# Patient Record
Sex: FEMALE | Race: BLACK OR AFRICAN AMERICAN | NOT HISPANIC OR LATINO | Employment: UNEMPLOYED | URBAN - METROPOLITAN AREA
[De-identification: names, ages, dates, MRNs, and addresses within clinical notes are randomized per-mention and may not be internally consistent; named-entity substitution may affect disease eponyms.]

---

## 2017-09-07 PROCEDURE — 99283 EMERGENCY DEPT VISIT LOW MDM: CPT

## 2017-09-08 ENCOUNTER — NURSE TRIAGE (OUTPATIENT)
Dept: ADMINISTRATIVE | Facility: CLINIC | Age: 27
End: 2017-09-08

## 2017-09-08 ENCOUNTER — HOSPITAL ENCOUNTER (EMERGENCY)
Facility: HOSPITAL | Age: 27
Discharge: HOME | End: 2017-09-08
Attending: EMERGENCY MEDICINE
Payer: MEDICAID

## 2017-09-08 VITALS
WEIGHT: 230 LBS | DIASTOLIC BLOOD PRESSURE: 68 MMHG | SYSTOLIC BLOOD PRESSURE: 142 MMHG | BODY MASS INDEX: 38.32 KG/M2 | HEART RATE: 86 BPM | HEIGHT: 65 IN | OXYGEN SATURATION: 99 % | TEMPERATURE: 99 F | RESPIRATION RATE: 18 BRPM

## 2017-09-08 DIAGNOSIS — R07.9 CHEST PAIN: ICD-10-CM

## 2017-09-08 DIAGNOSIS — T18.108A FOREIGN BODY IN ESOPHAGUS: Primary | ICD-10-CM

## 2017-09-08 DIAGNOSIS — T17.208A: ICD-10-CM

## 2017-09-08 PROCEDURE — 25000003 PHARM REV CODE 250: Performed by: EMERGENCY MEDICINE

## 2017-09-08 RX ADMIN — LIDOCAINE HYDROCHLORIDE: 20 SOLUTION ORAL; TOPICAL at 01:09

## 2017-09-08 NOTE — TELEPHONE ENCOUNTER
Reason for Disposition   Requesting regular office appointment    Protocols used: ST INFORMATION ONLY CALL-A-AH    Dahlia was calling to schedule an appointment with ENT.  She has already been evaluated in ER and referred to ENT.  No triage necessary.  Transferred to Appointment desk

## 2017-09-08 NOTE — ED PROVIDER NOTES
Encounter Date: 9/7/2017       History     Chief Complaint   Patient presents with    Foreign Body In Throat     states she has a shell stuck in her throat for 3 days now     Foreign Body: Patient complains of foreign body in throat/esophagus. It was first noticed 3 days ago. Symptoms: Multiple Attempts to remove it by eating bread and sandwiches have failed. She thinks that she can feel the claw of a crab on the right under her tonsils          Review of patient's allergies indicates:  No Known Allergies  Past Medical History:   Diagnosis Date    Nerve palsy     left arm     History reviewed. No pertinent surgical history.  History reviewed. No pertinent family history.  Social History   Substance Use Topics    Smoking status: Never Smoker    Smokeless tobacco: Never Used    Alcohol use Yes      Comment: occasionally     Review of Systems   Constitutional: Negative for fever.   HENT: Positive for trouble swallowing. Negative for sore throat.    Respiratory: Negative for shortness of breath.    Cardiovascular: Negative for chest pain.   Gastrointestinal: Negative for nausea.   Genitourinary: Negative for dysuria.   Musculoskeletal: Negative for back pain.   Skin: Negative for rash.   Neurological: Negative for weakness.   Hematological: Does not bruise/bleed easily.   All other systems reviewed and are negative.      Physical Exam     Initial Vitals   BP Pulse Resp Temp SpO2   -- -- -- -- --      MAP       --         Physical Exam    Constitutional: Vital signs are normal. She appears well-developed and well-nourished. She is cooperative.  Non-toxic appearance.   HENT:   Head: Normocephalic and atraumatic.   Mouth/Throat: Oropharynx is clear and moist and mucous membranes are normal. No posterior oropharyngeal edema or posterior oropharyngeal erythema.   Eyes: Conjunctivae, EOM and lids are normal.   Neck: Trachea normal and normal range of motion. Neck supple. No stridor present. No tracheal deviation present.  No neck rigidity. No Brudzinski's sign and no Kernig's sign noted.   Cardiovascular: Normal rate, regular rhythm, normal heart sounds and normal pulses.   Abdominal: Soft. Normal appearance and bowel sounds are normal. She exhibits no abdominal bruit. There is no tenderness. There is no rebound.   Neurological: She is alert and oriented to person, place, and time. She has normal strength. GCS eye subscore is 4. GCS verbal subscore is 5. GCS motor subscore is 6.   Skin: Skin is warm, dry and intact. Capillary refill takes less than 2 seconds.   Psychiatric: She has a normal mood and affect. Her behavior is normal. Judgment normal. Thought content is not delusional. She expresses no homicidal and no suicidal ideation.         ED Course   Procedures  Labs Reviewed - No data to display       X-Rays:   Independently Interpreted Readings:   Chest X-Ray: Normal heart size.  No infiltrates.  No acute abnormalities.   Other Readings:  Soft tissue films of the neck are unrevealing.   No FB seen.        - none    There were no vitals filed for this visit.         Imaging Results          X-Ray Neck Soft Tissue (In process)                X-Ray Chest PA And Lateral (In process)                    The above test results and vital signs have been reviewed by the physician.    I have a low suspicion for medical, surgical or other life threatening illness and I believe patient is safe for discharge.  I specifically counseled the patient and/or family/caretaker that despite an unrevealing evaluation in the ED, patient may still be at risk for serious, even life threatening illness.  I have attempted to answer all questions related to her stay today.  I have given the patient explicit instructions to return immediately for any worsening or change in current symptoms, or for any concern.      I will refer to ent for evaluation.  No crepitus.  No pneumomediastinum              ED Course      Clinical Impression:   The primary encounter  diagnosis was Foreign body in esophagus. Diagnoses of Chest pain and Pharyngeal foreign body, initial encounter were also pertinent to this visit.    Disposition:   Disposition: Discharged  Condition: Stable                        Roberth Metzger MD  09/08/17 0109

## 2018-08-19 ENCOUNTER — HOSPITAL ENCOUNTER (EMERGENCY)
Facility: HOSPITAL | Age: 28
Discharge: HOME | End: 2018-08-19
Attending: FAMILY MEDICINE
Payer: MEDICAID

## 2018-08-19 VITALS
OXYGEN SATURATION: 100 % | HEART RATE: 78 BPM | RESPIRATION RATE: 18 BRPM | DIASTOLIC BLOOD PRESSURE: 72 MMHG | SYSTOLIC BLOOD PRESSURE: 127 MMHG | TEMPERATURE: 99 F | BODY MASS INDEX: 34.11 KG/M2 | WEIGHT: 205 LBS

## 2018-08-19 DIAGNOSIS — O21.0 HYPEREMESIS GRAVIDARUM: Primary | ICD-10-CM

## 2018-08-19 LAB
ALBUMIN SERPL BCP-MCNC: 4 G/DL
ALP SERPL-CCNC: 42 U/L
ALT SERPL W/O P-5'-P-CCNC: 20 U/L
ANION GAP SERPL CALC-SCNC: 10 MMOL/L
AST SERPL-CCNC: 19 U/L
BACTERIA #/AREA URNS AUTO: NORMAL /HPF
BASOPHILS # BLD AUTO: 0.01 K/UL
BASOPHILS NFR BLD: 0.1 %
BILIRUB SERPL-MCNC: 0.6 MG/DL
BILIRUB UR QL STRIP: NEGATIVE
BUN SERPL-MCNC: 9 MG/DL
CALCIUM SERPL-MCNC: 9.3 MG/DL
CHLORIDE SERPL-SCNC: 104 MMOL/L
CLARITY UR REFRACT.AUTO: ABNORMAL
CO2 SERPL-SCNC: 23 MMOL/L
COLOR UR AUTO: YELLOW
CREAT SERPL-MCNC: 0.82 MG/DL
DIFFERENTIAL METHOD: ABNORMAL
EOSINOPHIL # BLD AUTO: 0.1 K/UL
EOSINOPHIL NFR BLD: 0.6 %
ERYTHROCYTE [DISTWIDTH] IN BLOOD BY AUTOMATED COUNT: 12.5 %
EST. GFR  (AFRICAN AMERICAN): >60 ML/MIN/1.73 M^2
EST. GFR  (NON AFRICAN AMERICAN): >60 ML/MIN/1.73 M^2
GLUCOSE SERPL-MCNC: 84 MG/DL
GLUCOSE UR QL STRIP: NEGATIVE
HCT VFR BLD AUTO: 32.1 %
HGB BLD-MCNC: 10.7 G/DL
HGB UR QL STRIP: NEGATIVE
HYALINE CASTS UR QL AUTO: 0 /LPF
KETONES UR QL STRIP: ABNORMAL
LEUKOCYTE ESTERASE UR QL STRIP: ABNORMAL
LYMPHOCYTES # BLD AUTO: 1.5 K/UL
LYMPHOCYTES NFR BLD: 18.2 %
MCH RBC QN AUTO: 28.7 PG
MCHC RBC AUTO-ENTMCNC: 33.3 G/DL
MCV RBC AUTO: 86 FL
MICROSCOPIC COMMENT: NORMAL
MONOCYTES # BLD AUTO: 0.8 K/UL
MONOCYTES NFR BLD: 9.2 %
NEUTROPHILS # BLD AUTO: 5.8 K/UL
NEUTROPHILS NFR BLD: 71.5 %
NITRITE UR QL STRIP: NEGATIVE
PH UR STRIP: 6 [PH] (ref 5–8)
PLATELET # BLD AUTO: 309 K/UL
PMV BLD AUTO: 10.1 FL
POTASSIUM SERPL-SCNC: 3.8 MMOL/L
PROT SERPL-MCNC: 7.6 G/DL
PROT UR QL STRIP: ABNORMAL
RBC # BLD AUTO: 3.73 M/UL
RBC #/AREA URNS AUTO: 1 /HPF (ref 0–4)
SODIUM SERPL-SCNC: 137 MMOL/L
SP GR UR STRIP: 1.02 (ref 1–1.03)
SQUAMOUS #/AREA URNS AUTO: NORMAL /HPF
URN SPEC COLLECT METH UR: ABNORMAL
UROBILINOGEN UR STRIP-ACNC: ABNORMAL EU/DL
WBC # BLD AUTO: 8.17 K/UL
WBC #/AREA URNS AUTO: 4 /HPF (ref 0–5)
WBC CLUMPS UR QL AUTO: NORMAL

## 2018-08-19 PROCEDURE — 81000 URINALYSIS NONAUTO W/SCOPE: CPT

## 2018-08-19 PROCEDURE — 96374 THER/PROPH/DIAG INJ IV PUSH: CPT

## 2018-08-19 PROCEDURE — 85025 COMPLETE CBC W/AUTO DIFF WBC: CPT

## 2018-08-19 PROCEDURE — 99283 EMERGENCY DEPT VISIT LOW MDM: CPT | Mod: 25

## 2018-08-19 PROCEDURE — 96361 HYDRATE IV INFUSION ADD-ON: CPT

## 2018-08-19 PROCEDURE — 63600175 PHARM REV CODE 636 W HCPCS: Performed by: FAMILY MEDICINE

## 2018-08-19 PROCEDURE — 80053 COMPREHEN METABOLIC PANEL: CPT

## 2018-08-19 PROCEDURE — 25000003 PHARM REV CODE 250: Performed by: FAMILY MEDICINE

## 2018-08-19 RX ORDER — ONDANSETRON 2 MG/ML
4 INJECTION INTRAMUSCULAR; INTRAVENOUS
Status: COMPLETED | OUTPATIENT
Start: 2018-08-19 | End: 2018-08-19

## 2018-08-19 RX ORDER — ONDANSETRON 4 MG/1
4 TABLET, FILM COATED ORAL EVERY 8 HOURS PRN
Qty: 12 TABLET | Refills: 0 | Status: ON HOLD | OUTPATIENT
Start: 2018-08-19 | End: 2019-02-20

## 2018-08-19 RX ADMIN — ONDANSETRON 4 MG: 2 INJECTION INTRAMUSCULAR; INTRAVENOUS at 06:08

## 2018-08-19 RX ADMIN — SODIUM CHLORIDE 1000 ML: 0.9 INJECTION, SOLUTION INTRAVENOUS at 06:08

## 2018-08-19 NOTE — ED PROVIDER NOTES
Encounter Date: 8/19/2018       History     Chief Complaint   Patient presents with    Vomiting     vomiting that started today with body aches    Generalized Body Aches     Patient is pregnant and complains of nausea since last 2 weeks she had few episodes of vomiting able to tolerate anything since morning.  Generalized body aches, no fever.  No lower abdominal pain. No vaginal discharge or dysuria.      The history is provided by the patient.     Review of patient's allergies indicates:  No Known Allergies  Past Medical History:   Diagnosis Date    Nerve palsy     left arm     History reviewed. No pertinent surgical history.  History reviewed. No pertinent family history.  Social History     Tobacco Use    Smoking status: Never Smoker    Smokeless tobacco: Never Used   Substance Use Topics    Alcohol use: Yes     Comment: occasionally    Drug use: Not on file     Review of Systems   Constitutional: Negative for activity change, appetite change, chills and fever.   HENT: Negative for congestion, ear pain and sore throat.    Eyes: Negative for pain, discharge and itching.   Respiratory: Negative for cough, shortness of breath and wheezing.    Cardiovascular: Negative for chest pain and palpitations.   Gastrointestinal: Positive for nausea and vomiting. Negative for abdominal distention, abdominal pain and diarrhea.   Genitourinary: Negative for dysuria, flank pain and frequency.   Musculoskeletal: Negative for back pain, neck pain and neck stiffness.   Skin: Negative for rash.   Neurological: Negative for dizziness and headaches.   Psychiatric/Behavioral: Negative for confusion and hallucinations. The patient is not nervous/anxious.    All other systems reviewed and are negative.      Physical Exam     Initial Vitals [08/19/18 1745]   BP Pulse Resp Temp SpO2   127/71 78 18 98.5 °F (36.9 °C) 99 %      MAP       --         Physical Exam    Nursing note and vitals reviewed.  Constitutional: She appears  well-developed and well-nourished.   HENT:   Head: Normocephalic.   Nose: Nose normal.   Eyes: Conjunctivae are normal. Pupils are equal, round, and reactive to light.   Neck: Normal range of motion.   Cardiovascular: Normal rate, regular rhythm, normal heart sounds and intact distal pulses.   Pulmonary/Chest: Breath sounds normal. No respiratory distress. She has no wheezes. She has no rhonchi. She has no rales.   Abdominal: Soft. There is no tenderness. There is no guarding.   Musculoskeletal: Normal range of motion.   Neurological: She is alert and oriented to person, place, and time. She has normal strength and normal reflexes.   Skin: Skin is warm. Capillary refill takes less than 2 seconds. No rash noted. No erythema.         ED Course   Procedures  Labs Reviewed   URINALYSIS          Imaging Results    None          Medical Decision Making:   Initial Assessment:   28-year-old female complains of nausea vomiting after getting pregnant for the last 2 weeks.  Has been taking her friend's Zofran but could not eat anything since this morning so came to the ER for evaluation.  Denies any abdominal pain, vaginal discharge on bleeding.  Differential Diagnosis:   Pregnancy, hyperemesis gravidarum, gastritis, UTI, dehydration.  Clinical Tests:   Lab Tests: Ordered and Reviewed  ED Management:  Patient has been given Zofran, IV fluids, labs within normal range,.  Patient's symptoms have improved and started tolerating p.o..  Patient symptoms most likely hyperemesis gravidarum.  Patient requested Zofran prescription.  Advise to follow up low BG if symptoms persist.  All to the ER if symptoms worsen.                      Clinical Impression:   The encounter diagnosis was Hyperemesis gravidarum.      Disposition:   Disposition: Discharged  Condition: Jonny George MD  08/20/18 8350

## 2018-08-20 NOTE — ED NOTES
Pt comfortably lying in bed. Respirations even and unlabored. Skin warm and dry. NAD noted. Pt reports relief from N/V symptoms. PO challenge of powerade and vlad crackers provided. Pt tolerating well. Vital signs remain stable. Call light remains in reach. Pt instructed to notify staff should needs arise. Understanding verbalized. Will continue to monitor.

## 2019-02-20 ENCOUNTER — HOSPITAL ENCOUNTER (OUTPATIENT)
Facility: HOSPITAL | Age: 29
Discharge: HOME | End: 2019-02-20
Attending: OBSTETRICS & GYNECOLOGY | Admitting: OBSTETRICS & GYNECOLOGY
Payer: MEDICAID

## 2019-02-20 VITALS
DIASTOLIC BLOOD PRESSURE: 75 MMHG | TEMPERATURE: 100 F | WEIGHT: 209 LBS | SYSTOLIC BLOOD PRESSURE: 137 MMHG | HEART RATE: 113 BPM | HEIGHT: 65 IN | RESPIRATION RATE: 18 BRPM | BODY MASS INDEX: 34.82 KG/M2

## 2019-02-20 DIAGNOSIS — R50.9 FEVER: ICD-10-CM

## 2019-02-20 LAB
ALBUMIN SERPL BCP-MCNC: 2.7 G/DL
ALP SERPL-CCNC: 119 U/L
ALT SERPL W/O P-5'-P-CCNC: 16 U/L
ANION GAP SERPL CALC-SCNC: 7 MMOL/L
AST SERPL-CCNC: 19 U/L
BACTERIA #/AREA URNS HPF: NORMAL /HPF
BASOPHILS # BLD AUTO: 0.01 K/UL
BASOPHILS NFR BLD: 0.1 %
BILIRUB SERPL-MCNC: 0.8 MG/DL
BILIRUB UR QL STRIP: NEGATIVE
BUN SERPL-MCNC: 5 MG/DL
CALCIUM SERPL-MCNC: 8.9 MG/DL
CHLORIDE SERPL-SCNC: 106 MMOL/L
CLARITY UR: ABNORMAL
CO2 SERPL-SCNC: 21 MMOL/L
COLOR UR: ABNORMAL
CREAT SERPL-MCNC: 0.8 MG/DL
DIFFERENTIAL METHOD: ABNORMAL
EOSINOPHIL # BLD AUTO: 0.1 K/UL
EOSINOPHIL NFR BLD: 0.6 %
ERYTHROCYTE [DISTWIDTH] IN BLOOD BY AUTOMATED COUNT: 13.2 %
EST. GFR  (AFRICAN AMERICAN): >60 ML/MIN/1.73 M^2
EST. GFR  (NON AFRICAN AMERICAN): >60 ML/MIN/1.73 M^2
GLUCOSE SERPL-MCNC: 70 MG/DL
GLUCOSE UR QL STRIP: NEGATIVE
HCT VFR BLD AUTO: 27.6 %
HGB BLD-MCNC: 8.9 G/DL
HGB UR QL STRIP: NEGATIVE
HYALINE CASTS #/AREA URNS LPF: 0 /LPF
INFLUENZA A, MOLECULAR: POSITIVE
INFLUENZA B, MOLECULAR: NEGATIVE
KETONES UR QL STRIP: ABNORMAL
LEUKOCYTE ESTERASE UR QL STRIP: NEGATIVE
LYMPHOCYTES # BLD AUTO: 1 K/UL
LYMPHOCYTES NFR BLD: 11.9 %
MCH RBC QN AUTO: 28.3 PG
MCHC RBC AUTO-ENTMCNC: 32.2 G/DL
MCV RBC AUTO: 88 FL
MICROSCOPIC COMMENT: NORMAL
MONOCYTES # BLD AUTO: 0.5 K/UL
MONOCYTES NFR BLD: 5.8 %
NEUTROPHILS # BLD AUTO: 6.7 K/UL
NEUTROPHILS NFR BLD: 80.8 %
NITRITE UR QL STRIP: NEGATIVE
PH UR STRIP: >8 [PH] (ref 5–8)
PLATELET # BLD AUTO: 219 K/UL
PMV BLD AUTO: 10.3 FL
POTASSIUM SERPL-SCNC: 3.7 MMOL/L
PROT SERPL-MCNC: 6.2 G/DL
PROT UR QL STRIP: ABNORMAL
RBC # BLD AUTO: 3.14 M/UL
RBC #/AREA URNS HPF: 1 /HPF (ref 0–4)
SODIUM SERPL-SCNC: 134 MMOL/L
SP GR UR STRIP: 1.01 (ref 1–1.03)
SPECIMEN SOURCE: ABNORMAL
URATE SERPL-MCNC: 4.7 MG/DL
URN SPEC COLLECT METH UR: ABNORMAL
UROBILINOGEN UR STRIP-ACNC: 1 EU/DL
WBC # BLD AUTO: 8.3 K/UL
WBC #/AREA URNS HPF: 3 /HPF (ref 0–5)

## 2019-02-20 PROCEDURE — 99211 OFF/OP EST MAY X REQ PHY/QHP: CPT | Mod: 25

## 2019-02-20 PROCEDURE — 87502 INFLUENZA DNA AMP PROBE: CPT

## 2019-02-20 PROCEDURE — 84550 ASSAY OF BLOOD/URIC ACID: CPT

## 2019-02-20 PROCEDURE — 85025 COMPLETE CBC W/AUTO DIFF WBC: CPT

## 2019-02-20 PROCEDURE — 25000003 PHARM REV CODE 250: Performed by: OBSTETRICS & GYNECOLOGY

## 2019-02-20 PROCEDURE — 59025 FETAL NON-STRESS TEST: CPT

## 2019-02-20 PROCEDURE — 80053 COMPREHEN METABOLIC PANEL: CPT

## 2019-02-20 PROCEDURE — 36415 COLL VENOUS BLD VENIPUNCTURE: CPT

## 2019-02-20 PROCEDURE — 81000 URINALYSIS NONAUTO W/SCOPE: CPT

## 2019-02-20 PROCEDURE — 96360 HYDRATION IV INFUSION INIT: CPT

## 2019-02-20 RX ORDER — ONDANSETRON 8 MG/1
8 TABLET, ORALLY DISINTEGRATING ORAL EVERY 8 HOURS PRN
Status: DISCONTINUED | OUTPATIENT
Start: 2019-02-20 | End: 2019-02-20 | Stop reason: HOSPADM

## 2019-02-20 RX ORDER — ACETAMINOPHEN 500 MG
500 TABLET ORAL EVERY 6 HOURS PRN
Status: DISCONTINUED | OUTPATIENT
Start: 2019-02-20 | End: 2019-02-20

## 2019-02-20 RX ORDER — ACETAMINOPHEN 500 MG
1000 TABLET ORAL EVERY 6 HOURS PRN
Status: DISCONTINUED | OUTPATIENT
Start: 2019-02-20 | End: 2019-02-20 | Stop reason: HOSPADM

## 2019-02-20 RX ADMIN — ACETAMINOPHEN 1000 MG: 500 TABLET ORAL at 12:02

## 2019-02-20 NOTE — NURSING
11:32 AM Patient in for ^ BP and fever, brought to triage 1, changed patient into clean gown.    11:41 AM Patient in to triage with body aches and fever. Patient was feeling hot and cold at home, jaw ache and frontal head pressure. Went to Dr Viera's office and temp was 101, sent over here. + FM, denies abd pain, denies vaginal bleeding or leakage of fluid. Denies taking medication for pain, headache or fever. Patient endorses light unproductive cough. Baby placed on monitor, VS obtained, Temp 99.6 and patient warm to touch. Cervix closed, thick, and high.     11:54 AM Spoke to Dr Viera, informed of the above. MD stated in the office she had a fever of 101 and +2 protein in the urine. MD ordered serial BP's, CBC, CMP, Uric Acid, clean catch U/A, and a rapid flu test.     12:08 PM Nasal swab collected, sent to lab. Face mask placed on patient, lab at bedside to draw blood.     1:48 PM Updated Dr Viera on patient's condition and labs, patient feeling minor ctx on strip, MD ordered a litre of LR for hydration, will review strip.     3:15 PM Spoke with MD, stated patient's abd pain improvement and fetal strip improvement. MD said okay for patient to be d/c home, is sending an Rx to her pharmacy for the flu.     3:26 PM Reviewed discharge instructions with patient, educating on expected third trimester changes, influenza, and s/s to call in for. Patient verbalized acknowledgement, IV removed, pressure and bandage applied. Patient to get dressed and ambulate from unit.     3:40 PM Patient ambulated from unit with mask on without difficulty.

## 2019-03-13 ENCOUNTER — ANESTHESIA (OUTPATIENT)
Dept: OBSTETRICS AND GYNECOLOGY | Facility: HOSPITAL | Age: 29
End: 2019-03-13
Payer: MEDICAID

## 2019-03-13 ENCOUNTER — ANESTHESIA EVENT (OUTPATIENT)
Dept: OBSTETRICS AND GYNECOLOGY | Facility: HOSPITAL | Age: 29
End: 2019-03-13
Payer: MEDICAID

## 2019-03-13 ENCOUNTER — HOSPITAL ENCOUNTER (INPATIENT)
Facility: HOSPITAL | Age: 29
LOS: 4 days | Discharge: HOME | End: 2019-03-17
Attending: OBSTETRICS & GYNECOLOGY | Admitting: OBSTETRICS & GYNECOLOGY
Payer: MEDICAID

## 2019-03-13 DIAGNOSIS — O16.3 HYPERTENSION AFFECTING PREGNANCY IN THIRD TRIMESTER: ICD-10-CM

## 2019-03-13 LAB
ABO + RH BLD: NORMAL
ALBUMIN SERPL BCP-MCNC: 2.8 G/DL
ALP SERPL-CCNC: 162 U/L
ALT SERPL W/O P-5'-P-CCNC: 15 U/L
ANION GAP SERPL CALC-SCNC: 8 MMOL/L
ANISOCYTOSIS BLD QL SMEAR: SLIGHT
AST SERPL-CCNC: 14 U/L
BACTERIA #/AREA URNS HPF: ABNORMAL /HPF
BASOPHILS # BLD AUTO: 0.01 K/UL
BASOPHILS NFR BLD: 0.1 %
BILIRUB SERPL-MCNC: 0.6 MG/DL
BILIRUB UR QL STRIP: NEGATIVE
BILIRUB UR QL STRIP: NEGATIVE
BLD GP AB SCN CELLS X3 SERPL QL: NORMAL
BUN SERPL-MCNC: 7 MG/DL
CALCIUM SERPL-MCNC: 8.9 MG/DL
CHLORIDE SERPL-SCNC: 107 MMOL/L
CLARITY UR: ABNORMAL
CLARITY UR: CLEAR
CO2 SERPL-SCNC: 20 MMOL/L
COLOR UR: YELLOW
COLOR UR: YELLOW
CREAT SERPL-MCNC: 0.8 MG/DL
CREAT UR-MCNC: 286.5 MG/DL
CREAT UR-MCNC: 84.7 MG/DL
DIFFERENTIAL METHOD: ABNORMAL
EOSINOPHIL # BLD AUTO: 0.1 K/UL
EOSINOPHIL NFR BLD: 0.9 %
ERYTHROCYTE [DISTWIDTH] IN BLOOD BY AUTOMATED COUNT: 13.7 %
EST. GFR  (AFRICAN AMERICAN): >60 ML/MIN/1.73 M^2
EST. GFR  (NON AFRICAN AMERICAN): >60 ML/MIN/1.73 M^2
GLUCOSE SERPL-MCNC: 107 MG/DL
GLUCOSE UR QL STRIP: NEGATIVE
GLUCOSE UR QL STRIP: NEGATIVE
HCT VFR BLD AUTO: 30.1 %
HGB BLD-MCNC: 9.5 G/DL
HGB UR QL STRIP: NEGATIVE
HGB UR QL STRIP: NEGATIVE
HYALINE CASTS #/AREA URNS LPF: 0 /LPF
HYPOCHROMIA BLD QL SMEAR: ABNORMAL
KETONES UR QL STRIP: ABNORMAL
KETONES UR QL STRIP: NEGATIVE
LEUKOCYTE ESTERASE UR QL STRIP: NEGATIVE
LEUKOCYTE ESTERASE UR QL STRIP: NEGATIVE
LYMPHOCYTES # BLD AUTO: 1.6 K/UL
LYMPHOCYTES NFR BLD: 19.4 %
MCH RBC QN AUTO: 27.9 PG
MCHC RBC AUTO-ENTMCNC: 31.6 G/DL
MCV RBC AUTO: 89 FL
MICROSCOPIC COMMENT: ABNORMAL
MONOCYTES # BLD AUTO: 0.8 K/UL
MONOCYTES NFR BLD: 10.1 %
NEUTROPHILS # BLD AUTO: 5.7 K/UL
NEUTROPHILS NFR BLD: 69.5 %
NITRITE UR QL STRIP: NEGATIVE
NITRITE UR QL STRIP: NEGATIVE
OVALOCYTES BLD QL SMEAR: ABNORMAL
PH UR STRIP: 6 [PH] (ref 5–8)
PH UR STRIP: 7 [PH] (ref 5–8)
PLATELET # BLD AUTO: 332 K/UL
PLATELET BLD QL SMEAR: ABNORMAL
PMV BLD AUTO: 10.8 FL
POIKILOCYTOSIS BLD QL SMEAR: SLIGHT
POLYCHROMASIA BLD QL SMEAR: ABNORMAL
POTASSIUM SERPL-SCNC: 3.6 MMOL/L
PROT SERPL-MCNC: 6.9 G/DL
PROT UR QL STRIP: ABNORMAL
PROT UR QL STRIP: NEGATIVE
PROT UR-MCNC: 15 MG/DL
PROT UR-MCNC: 161 MG/DL
PROT/CREAT UR: 0.18 MG/G{CREAT}
PROT/CREAT UR: 0.56 MG/G{CREAT}
RBC # BLD AUTO: 3.4 M/UL
RBC #/AREA URNS HPF: 0 /HPF (ref 0–4)
SODIUM SERPL-SCNC: 135 MMOL/L
SP GR UR STRIP: 1.01 (ref 1–1.03)
SP GR UR STRIP: >=1.03 (ref 1–1.03)
SQUAMOUS #/AREA URNS HPF: 10 /HPF
URATE SERPL-MCNC: 4.4 MG/DL
URN SPEC COLLECT METH UR: ABNORMAL
URN SPEC COLLECT METH UR: NORMAL
UROBILINOGEN UR STRIP-ACNC: 1 EU/DL
UROBILINOGEN UR STRIP-ACNC: NEGATIVE EU/DL
WBC # BLD AUTO: 8.14 K/UL
WBC #/AREA URNS HPF: 0 /HPF (ref 0–5)

## 2019-03-13 PROCEDURE — 86592 SYPHILIS TEST NON-TREP QUAL: CPT

## 2019-03-13 PROCEDURE — 72100002 HC LABOR CARE, 1ST 8 HOURS

## 2019-03-13 PROCEDURE — 27800516 HC TRAY, EPIDURAL COMBO: Performed by: ANESTHESIOLOGY

## 2019-03-13 PROCEDURE — 36415 COLL VENOUS BLD VENIPUNCTURE: CPT

## 2019-03-13 PROCEDURE — 81003 URINALYSIS AUTO W/O SCOPE: CPT

## 2019-03-13 PROCEDURE — 81000 URINALYSIS NONAUTO W/SCOPE: CPT

## 2019-03-13 PROCEDURE — 25000003 PHARM REV CODE 250: Performed by: OBSTETRICS & GYNECOLOGY

## 2019-03-13 PROCEDURE — 85025 COMPLETE CBC W/AUTO DIFF WBC: CPT

## 2019-03-13 PROCEDURE — 27200710 HC EPIDURAL INFUSION PUMP SET: Performed by: ANESTHESIOLOGY

## 2019-03-13 PROCEDURE — 11000001 HC ACUTE MED/SURG PRIVATE ROOM

## 2019-03-13 PROCEDURE — 59025 FETAL NON-STRESS TEST: CPT

## 2019-03-13 PROCEDURE — 82570 ASSAY OF URINE CREATININE: CPT

## 2019-03-13 PROCEDURE — 62326 NJX INTERLAMINAR LMBR/SAC: CPT | Performed by: ANESTHESIOLOGY

## 2019-03-13 PROCEDURE — 86901 BLOOD TYPING SEROLOGIC RH(D): CPT

## 2019-03-13 PROCEDURE — 84550 ASSAY OF BLOOD/URIC ACID: CPT

## 2019-03-13 PROCEDURE — 51702 INSERT TEMP BLADDER CATH: CPT

## 2019-03-13 PROCEDURE — 80053 COMPREHEN METABOLIC PANEL: CPT

## 2019-03-13 PROCEDURE — 25000003 PHARM REV CODE 250: Performed by: FAMILY MEDICINE

## 2019-03-13 PROCEDURE — 63600175 PHARM REV CODE 636 W HCPCS: Performed by: OBSTETRICS & GYNECOLOGY

## 2019-03-13 RX ORDER — OXYTOCIN/RINGER'S LACTATE 20/1000 ML
333 PLASTIC BAG, INJECTION (ML) INTRAVENOUS CONTINUOUS
Status: ACTIVE | OUTPATIENT
Start: 2019-03-13 | End: 2019-03-13

## 2019-03-13 RX ORDER — ROPIVACAINE HYDROCHLORIDE 2 MG/ML
INJECTION, SOLUTION EPIDURAL; INFILTRATION; PERINEURAL
Status: DISPENSED
Start: 2019-03-13 | End: 2019-03-14

## 2019-03-13 RX ORDER — ONDANSETRON 8 MG/1
8 TABLET, ORALLY DISINTEGRATING ORAL EVERY 8 HOURS PRN
Status: DISCONTINUED | OUTPATIENT
Start: 2019-03-13 | End: 2019-03-14 | Stop reason: SDUPTHER

## 2019-03-13 RX ORDER — SODIUM CHLORIDE, SODIUM LACTATE, POTASSIUM CHLORIDE, CALCIUM CHLORIDE 600; 310; 30; 20 MG/100ML; MG/100ML; MG/100ML; MG/100ML
INJECTION, SOLUTION INTRAVENOUS CONTINUOUS
Status: DISCONTINUED | OUTPATIENT
Start: 2019-03-13 | End: 2019-03-14

## 2019-03-13 RX ORDER — OXYTOCIN/RINGER'S LACTATE 20/1000 ML
41.7 PLASTIC BAG, INJECTION (ML) INTRAVENOUS CONTINUOUS
Status: DISPENSED | OUTPATIENT
Start: 2019-03-13 | End: 2019-03-13

## 2019-03-13 RX ORDER — OXYTOCIN/RINGER'S LACTATE 20/1000 ML
2 PLASTIC BAG, INJECTION (ML) INTRAVENOUS CONTINUOUS
Status: DISCONTINUED | OUTPATIENT
Start: 2019-03-13 | End: 2019-03-14

## 2019-03-13 RX ORDER — SODIUM CHLORIDE 9 MG/ML
INJECTION, SOLUTION INTRAVENOUS
Status: DISCONTINUED | OUTPATIENT
Start: 2019-03-13 | End: 2019-03-14

## 2019-03-13 RX ORDER — FENTANYL CITRATE 50 UG/ML
INJECTION, SOLUTION INTRAMUSCULAR; INTRAVENOUS
Status: DISPENSED
Start: 2019-03-13 | End: 2019-03-14

## 2019-03-13 RX ORDER — NALOXONE HCL 0.4 MG/ML
0.02 VIAL (ML) INJECTION
Status: DISCONTINUED | OUTPATIENT
Start: 2019-03-13 | End: 2019-03-17 | Stop reason: HOSPADM

## 2019-03-13 RX ORDER — ROPIVACAINE HYDROCHLORIDE 2 MG/ML
14 INJECTION, SOLUTION EPIDURAL; INFILTRATION; PERINEURAL CONTINUOUS
Status: DISCONTINUED | OUTPATIENT
Start: 2019-03-14 | End: 2019-03-14

## 2019-03-13 RX ADMIN — Medication 12 ML/HR: at 03:03

## 2019-03-13 RX ADMIN — SODIUM CHLORIDE, SODIUM LACTATE, POTASSIUM CHLORIDE, AND CALCIUM CHLORIDE: .6; .31; .03; .02 INJECTION, SOLUTION INTRAVENOUS at 02:03

## 2019-03-13 RX ADMIN — SODIUM CHLORIDE, SODIUM LACTATE, POTASSIUM CHLORIDE, AND CALCIUM CHLORIDE: .6; .31; .03; .02 INJECTION, SOLUTION INTRAVENOUS at 01:03

## 2019-03-13 RX ADMIN — SODIUM CHLORIDE, SODIUM LACTATE, POTASSIUM CHLORIDE, AND CALCIUM CHLORIDE: .6; .31; .03; .02 INJECTION, SOLUTION INTRAVENOUS at 07:03

## 2019-03-13 RX ADMIN — Medication 2 MILLI-UNITS/MIN: at 01:03

## 2019-03-13 NOTE — ANESTHESIA PREPROCEDURE EVALUATION
2019  Dahlia Forrest is a 28 y.o., female   At 38 weeks admitted for induction of labor due to new hypertension. Requesting labor analgesia.     Anesthesia Evaluation    I have reviewed the Patient Summary Reports.    I have reviewed the Nursing Notes.   I have reviewed the Medications.     Review of Systems  Anesthesia Hx:  No previous Anesthesia  Neg history of prior surgery.   Social:  Non-Smoker    Cardiovascular:   Hypertension    Renal/:  Renal/ Normal     Hepatic/GI:  Hepatic/GI Normal    Musculoskeletal:  Musculoskeletal Normal    OB/GYN/PEDS:  Previous vaginal delivery, no complications   Neurological:  Neurology Normal    Psych:  Psychiatric Normal           Physical Exam  General:  Well nourished    Airway/Jaw/Neck:  Airway Findings: Mouth Opening: Normal Tongue: Large  General Airway Assessment: Adult, Average  Mallampati: II  Improves to II with phonation.  TM Distance: Normal, at least 6 cm            Mental Status:  Mental Status Findings:  Cooperative, Alert and Oriented         Anesthesia Plan  Type of Anesthesia, risks & benefits discussed:  Anesthesia Type:  CSE  Patient's Preference:   Intra-op Monitoring Plan: standard ASA monitors  Intra-op Monitoring Plan Comments:   Post Op Pain Control Plan: per primary service following discharge from PACU  Post Op Pain Control Plan Comments:   Induction:   IV  Beta Blocker:  Patient is not currently on a Beta-Blocker (No further documentation required).       Informed Consent: Patient understands risks and agrees with Anesthesia plan.  Questions answered. Anesthesia consent signed with patient.  ASA Score: 3     Day of Surgery Review of History & Physical: I have interviewed and examined the patient. I have reviewed the patient's H&P dated:  There are no significant changes.          Ready For Surgery From Anesthesia Perspective.

## 2019-03-13 NOTE — H&P
IUP at 38 wks Sent in for IOL for borderline BP  See prenatal record, no change in history and physical    IUP at 38 wks  Borderline BP  Admit for delivery  Monitor for PIH

## 2019-03-13 NOTE — ANESTHESIA PROCEDURE NOTES
CSE    Patient location during procedure: OB  Start time: 3/13/2019 3:30 PM  Timeout: 3/13/2019 3:30 PM  End time: 3/13/2019 4:22 PM  Reason for block: at surgeon's request  Staffing  Anesthesiologist: Eugenio Pahceco MD  Resident/CRNA: Anita Valles MD  Performed: anesthesiologist and resident/CRNA   Preanesthetic Checklist  Completed: patient identified, site marked, surgical consent, pre-op evaluation, timeout performed, IV checked, risks and benefits discussed and monitors and equipment checked  CSE  Patient position: sitting  Prep: ChloraPrep  Patient monitoring: heart rate, cardiac monitor, continuous pulse ox and frequent blood pressure checks  Approach: midline  Spinal Needle  Needle type: pencil-tip   Needle gauge: 24 G  Needle length: 5 in  Epidural Needle  Injection technique: RICARDA air  Needle type: Tuohy   Needle gauge: 17 G  Needle length: 4 in  Needle insertion depth: 7 cm  Location: L4-5  Needle localization: anatomical landmarks  Catheter  Catheter type: end hole  Catheter size: 20 G  Catheter at skin depth: 12 cm  Test dose: lidocaine 1.5% with Epi 1-to-200,000  Additional Documentation: incremental injection  Assessment  Sensory level: T11

## 2019-03-14 LAB — RPR SER QL: NORMAL

## 2019-03-14 PROCEDURE — 37000008 HC ANESTHESIA 1ST 15 MINUTES: Performed by: OBSTETRICS & GYNECOLOGY

## 2019-03-14 PROCEDURE — 62326 NJX INTERLAMINAR LMBR/SAC: CPT | Performed by: ANESTHESIOLOGY

## 2019-03-14 PROCEDURE — 94799 UNLISTED PULMONARY SVC/PX: CPT

## 2019-03-14 PROCEDURE — 63600175 PHARM REV CODE 636 W HCPCS: Performed by: ANESTHESIOLOGY

## 2019-03-14 PROCEDURE — 11000001 HC ACUTE MED/SURG PRIVATE ROOM

## 2019-03-14 PROCEDURE — 27000181 HC CABLE, IUPC

## 2019-03-14 PROCEDURE — 63600175 PHARM REV CODE 636 W HCPCS

## 2019-03-14 PROCEDURE — 72100003 HC LABOR CARE, EA. ADDL. 8 HRS

## 2019-03-14 PROCEDURE — 72100002 HC LABOR CARE, 1ST 8 HOURS

## 2019-03-14 PROCEDURE — 25000003 PHARM REV CODE 250: Performed by: OBSTETRICS & GYNECOLOGY

## 2019-03-14 PROCEDURE — 51702 INSERT TEMP BLADDER CATH: CPT

## 2019-03-14 PROCEDURE — 37000009 HC ANESTHESIA EA ADD 15 MINS: Performed by: OBSTETRICS & GYNECOLOGY

## 2019-03-14 PROCEDURE — 36000685 HC CESAREAN SECTION LEVEL I

## 2019-03-14 PROCEDURE — 25000003 PHARM REV CODE 250: Performed by: ANESTHESIOLOGY

## 2019-03-14 PROCEDURE — 25000003 PHARM REV CODE 250

## 2019-03-14 PROCEDURE — S0028 INJECTION, FAMOTIDINE, 20 MG: HCPCS

## 2019-03-14 RX ORDER — OXYCODONE AND ACETAMINOPHEN 5; 325 MG/1; MG/1
1 TABLET ORAL EVERY 4 HOURS PRN
Status: DISCONTINUED | OUTPATIENT
Start: 2019-03-14 | End: 2019-03-17 | Stop reason: HOSPADM

## 2019-03-14 RX ORDER — KETOROLAC TROMETHAMINE 30 MG/ML
INJECTION, SOLUTION INTRAMUSCULAR; INTRAVENOUS
Status: DISCONTINUED | OUTPATIENT
Start: 2019-03-14 | End: 2019-03-14

## 2019-03-14 RX ORDER — DOCUSATE SODIUM 100 MG/1
200 CAPSULE, LIQUID FILLED ORAL 2 TIMES DAILY
Status: DISCONTINUED | OUTPATIENT
Start: 2019-03-14 | End: 2019-03-17 | Stop reason: HOSPADM

## 2019-03-14 RX ORDER — DIPHENHYDRAMINE HCL 25 MG
25 CAPSULE ORAL EVERY 4 HOURS PRN
Status: DISCONTINUED | OUTPATIENT
Start: 2019-03-14 | End: 2019-03-17 | Stop reason: HOSPADM

## 2019-03-14 RX ORDER — FENTANYL CITRATE 50 UG/ML
INJECTION, SOLUTION INTRAMUSCULAR; INTRAVENOUS
Status: DISPENSED
Start: 2019-03-14 | End: 2019-03-14

## 2019-03-14 RX ORDER — KETOROLAC TROMETHAMINE 30 MG/ML
30 INJECTION, SOLUTION INTRAMUSCULAR; INTRAVENOUS EVERY 6 HOURS
Status: DISPENSED | OUTPATIENT
Start: 2019-03-14 | End: 2019-03-15

## 2019-03-14 RX ORDER — SIMETHICONE 80 MG
1 TABLET,CHEWABLE ORAL EVERY 6 HOURS PRN
Status: DISCONTINUED | OUTPATIENT
Start: 2019-03-14 | End: 2019-03-17 | Stop reason: HOSPADM

## 2019-03-14 RX ORDER — NAPROXEN 500 MG/1
500 TABLET ORAL EVERY 8 HOURS PRN
Status: DISCONTINUED | OUTPATIENT
Start: 2019-03-14 | End: 2019-03-17 | Stop reason: HOSPADM

## 2019-03-14 RX ORDER — ACETAMINOPHEN 325 MG/1
650 TABLET ORAL EVERY 6 HOURS
Status: DISPENSED | OUTPATIENT
Start: 2019-03-14 | End: 2019-03-15

## 2019-03-14 RX ORDER — SODIUM CITRATE AND CITRIC ACID MONOHYDRATE 334; 500 MG/5ML; MG/5ML
SOLUTION ORAL
Status: COMPLETED
Start: 2019-03-14 | End: 2019-03-14

## 2019-03-14 RX ORDER — ROPIVACAINE HYDROCHLORIDE 2 MG/ML
INJECTION, SOLUTION EPIDURAL; INFILTRATION; PERINEURAL
Status: DISPENSED
Start: 2019-03-14 | End: 2019-03-14

## 2019-03-14 RX ORDER — MORPHINE SULFATE 4 MG/ML
2 INJECTION, SOLUTION INTRAMUSCULAR; INTRAVENOUS
Status: DISCONTINUED | OUTPATIENT
Start: 2019-03-14 | End: 2019-03-17 | Stop reason: HOSPADM

## 2019-03-14 RX ORDER — FAMOTIDINE 10 MG/ML
INJECTION INTRAVENOUS
Status: COMPLETED
Start: 2019-03-14 | End: 2019-03-14

## 2019-03-14 RX ORDER — MORPHINE SULFATE 4 MG/ML
INJECTION, SOLUTION INTRAMUSCULAR; INTRAVENOUS
Status: DISPENSED
Start: 2019-03-14 | End: 2019-03-15

## 2019-03-14 RX ORDER — LIDOCAINE HCL/EPINEPHRINE/PF 2%-1:200K
VIAL (ML) INJECTION
Status: DISCONTINUED | OUTPATIENT
Start: 2019-03-14 | End: 2019-03-14

## 2019-03-14 RX ORDER — CEFAZOLIN SODIUM 2 G/50ML
2 SOLUTION INTRAVENOUS ONCE
Status: DISCONTINUED | OUTPATIENT
Start: 2019-03-14 | End: 2019-03-14

## 2019-03-14 RX ORDER — OXYCODONE HYDROCHLORIDE 5 MG/1
10 TABLET ORAL EVERY 4 HOURS PRN
Status: DISCONTINUED | OUTPATIENT
Start: 2019-03-14 | End: 2019-03-17 | Stop reason: HOSPADM

## 2019-03-14 RX ORDER — BISACODYL 10 MG
10 SUPPOSITORY, RECTAL RECTAL ONCE AS NEEDED
Status: DISCONTINUED | OUTPATIENT
Start: 2019-03-14 | End: 2019-03-17 | Stop reason: HOSPADM

## 2019-03-14 RX ORDER — SODIUM CHLORIDE, SODIUM LACTATE, POTASSIUM CHLORIDE, CALCIUM CHLORIDE 600; 310; 30; 20 MG/100ML; MG/100ML; MG/100ML; MG/100ML
INJECTION, SOLUTION INTRAVENOUS CONTINUOUS
Status: ACTIVE | OUTPATIENT
Start: 2019-03-14 | End: 2019-03-15

## 2019-03-14 RX ORDER — METOCLOPRAMIDE HYDROCHLORIDE 5 MG/ML
INJECTION INTRAMUSCULAR; INTRAVENOUS
Status: COMPLETED
Start: 2019-03-14 | End: 2019-03-14

## 2019-03-14 RX ORDER — SODIUM CHLORIDE 9 MG/ML
INJECTION, SOLUTION INTRAVENOUS CONTINUOUS
Status: DISCONTINUED | OUTPATIENT
Start: 2019-03-14 | End: 2019-03-14

## 2019-03-14 RX ORDER — NIFEDIPINE 30 MG/1
60 TABLET, EXTENDED RELEASE ORAL DAILY
Status: DISCONTINUED | OUTPATIENT
Start: 2019-03-14 | End: 2019-03-17 | Stop reason: HOSPADM

## 2019-03-14 RX ORDER — FENTANYL CITRATE 50 UG/ML
INJECTION, SOLUTION INTRAMUSCULAR; INTRAVENOUS
Status: DISCONTINUED
Start: 2019-03-14 | End: 2019-03-14 | Stop reason: WASHOUT

## 2019-03-14 RX ORDER — DIPHENHYDRAMINE HYDROCHLORIDE 50 MG/ML
12.5 INJECTION INTRAMUSCULAR; INTRAVENOUS
Status: ACTIVE | OUTPATIENT
Start: 2019-03-14 | End: 2019-03-14

## 2019-03-14 RX ORDER — HYDROCORTISONE 25 MG/G
CREAM TOPICAL 3 TIMES DAILY PRN
Status: DISCONTINUED | OUTPATIENT
Start: 2019-03-14 | End: 2019-03-17 | Stop reason: HOSPADM

## 2019-03-14 RX ORDER — MUPIROCIN 20 MG/G
OINTMENT TOPICAL 2 TIMES DAILY
Status: DISCONTINUED | OUTPATIENT
Start: 2019-03-14 | End: 2019-03-15

## 2019-03-14 RX ORDER — MORPHINE SULFATE 1 MG/ML
INJECTION, SOLUTION EPIDURAL; INTRATHECAL; INTRAVENOUS
Status: DISCONTINUED | OUTPATIENT
Start: 2019-03-14 | End: 2019-03-14

## 2019-03-14 RX ORDER — DIPHENHYDRAMINE HYDROCHLORIDE 50 MG/ML
12.5 INJECTION INTRAMUSCULAR; INTRAVENOUS NIGHTLY PRN
Status: DISCONTINUED | OUTPATIENT
Start: 2019-03-14 | End: 2019-03-17 | Stop reason: HOSPADM

## 2019-03-14 RX ORDER — SODIUM CHLORIDE, SODIUM LACTATE, POTASSIUM CHLORIDE, CALCIUM CHLORIDE 600; 310; 30; 20 MG/100ML; MG/100ML; MG/100ML; MG/100ML
INJECTION, SOLUTION INTRAVENOUS CONTINUOUS PRN
Status: DISCONTINUED | OUTPATIENT
Start: 2019-03-14 | End: 2019-03-14

## 2019-03-14 RX ORDER — OXYCODONE AND ACETAMINOPHEN 10; 325 MG/1; MG/1
1 TABLET ORAL EVERY 4 HOURS PRN
Status: DISCONTINUED | OUTPATIENT
Start: 2019-03-14 | End: 2019-03-17 | Stop reason: HOSPADM

## 2019-03-14 RX ORDER — METOCLOPRAMIDE 10 MG/1
10 TABLET ORAL EVERY 6 HOURS PRN
Status: DISCONTINUED | OUTPATIENT
Start: 2019-03-14 | End: 2019-03-17 | Stop reason: HOSPADM

## 2019-03-14 RX ORDER — ADHESIVE BANDAGE
30 BANDAGE TOPICAL 2 TIMES DAILY PRN
Status: DISCONTINUED | OUTPATIENT
Start: 2019-03-15 | End: 2019-03-17 | Stop reason: HOSPADM

## 2019-03-14 RX ORDER — ONDANSETRON 2 MG/ML
4 INJECTION INTRAMUSCULAR; INTRAVENOUS EVERY 12 HOURS PRN
Status: DISCONTINUED | OUTPATIENT
Start: 2019-03-14 | End: 2019-03-17 | Stop reason: HOSPADM

## 2019-03-14 RX ORDER — MISOPROSTOL 200 UG/1
600 TABLET ORAL
Status: DISCONTINUED | OUTPATIENT
Start: 2019-03-14 | End: 2019-03-17 | Stop reason: HOSPADM

## 2019-03-14 RX ORDER — MUPIROCIN 20 MG/G
1 OINTMENT TOPICAL 2 TIMES DAILY
Status: DISCONTINUED | OUTPATIENT
Start: 2019-03-14 | End: 2019-03-17 | Stop reason: HOSPADM

## 2019-03-14 RX ORDER — MUPIROCIN 20 MG/G
OINTMENT TOPICAL
Status: DISPENSED
Start: 2019-03-14 | End: 2019-03-15

## 2019-03-14 RX ORDER — OXYTOCIN/RINGER'S LACTATE 20/1000 ML
41.65 PLASTIC BAG, INJECTION (ML) INTRAVENOUS CONTINUOUS
Status: DISPENSED | OUTPATIENT
Start: 2019-03-14 | End: 2019-03-14

## 2019-03-14 RX ORDER — ONDANSETRON 8 MG/1
8 TABLET, ORALLY DISINTEGRATING ORAL EVERY 8 HOURS PRN
Status: DISCONTINUED | OUTPATIENT
Start: 2019-03-14 | End: 2019-03-17 | Stop reason: HOSPADM

## 2019-03-14 RX ORDER — ONDANSETRON HYDROCHLORIDE 2 MG/ML
INJECTION, SOLUTION INTRAMUSCULAR; INTRAVENOUS
Status: DISCONTINUED | OUTPATIENT
Start: 2019-03-14 | End: 2019-03-14

## 2019-03-14 RX ORDER — OXYTOCIN 10 [USP'U]/ML
INJECTION, SOLUTION INTRAMUSCULAR; INTRAVENOUS
Status: DISCONTINUED | OUTPATIENT
Start: 2019-03-14 | End: 2019-03-14

## 2019-03-14 RX ADMIN — FAMOTIDINE 20 MG: 10 INJECTION, SOLUTION INTRAVENOUS at 12:03

## 2019-03-14 RX ADMIN — NIFEDIPINE 60 MG: 30 TABLET, FILM COATED, EXTENDED RELEASE ORAL at 06:03

## 2019-03-14 RX ADMIN — KETOROLAC TROMETHAMINE 30 MG: 30 INJECTION, SOLUTION INTRAMUSCULAR; INTRAVENOUS at 01:03

## 2019-03-14 RX ADMIN — LIDOCAINE HYDROCHLORIDE AND EPINEPHRINE 5 ML: 20; 5 INJECTION, SOLUTION EPIDURAL; INFILTRATION; INTRACAUDAL; PERINEURAL at 12:03

## 2019-03-14 RX ADMIN — SODIUM CHLORIDE, SODIUM LACTATE, POTASSIUM CHLORIDE, AND CALCIUM CHLORIDE: .6; .31; .03; .02 INJECTION, SOLUTION INTRAVENOUS at 12:03

## 2019-03-14 RX ADMIN — METOCLOPRAMIDE 10 MG: 5 INJECTION, SOLUTION INTRAMUSCULAR; INTRAVENOUS at 12:03

## 2019-03-14 RX ADMIN — MORPHINE SULFATE 4 MG: 1 INJECTION, SOLUTION EPIDURAL; INTRATHECAL; INTRAVENOUS at 01:03

## 2019-03-14 RX ADMIN — SODIUM CHLORIDE, SODIUM LACTATE, POTASSIUM CHLORIDE, AND CALCIUM CHLORIDE: .6; .31; .03; .02 INJECTION, SOLUTION INTRAVENOUS at 03:03

## 2019-03-14 RX ADMIN — SODIUM CITRATE AND CITRIC ACID MONOHYDRATE 30 ML: 500; 334 SOLUTION ORAL at 12:03

## 2019-03-14 RX ADMIN — DEXTROSE 2 G: 50 INJECTION, SOLUTION INTRAVENOUS at 12:03

## 2019-03-14 RX ADMIN — MORPHINE SULFATE 2 MG: 4 INJECTION INTRAVENOUS at 05:03

## 2019-03-14 RX ADMIN — OXYTOCIN 30 UNITS: 10 INJECTION, SOLUTION INTRAMUSCULAR; INTRAVENOUS at 01:03

## 2019-03-14 RX ADMIN — ONDANSETRON 4 MG: 2 INJECTION, SOLUTION INTRAMUSCULAR; INTRAVENOUS at 01:03

## 2019-03-14 RX ADMIN — LIDOCAINE HYDROCHLORIDE AND EPINEPHRINE 10 ML: 20; 5 INJECTION, SOLUTION EPIDURAL; INFILTRATION; INTRACAUDAL; PERINEURAL at 12:03

## 2019-03-14 RX ADMIN — AZITHROMYCIN MONOHYDRATE 500 MG: 500 INJECTION, POWDER, LYOPHILIZED, FOR SOLUTION INTRAVENOUS at 12:03

## 2019-03-14 RX ADMIN — MORPHINE SULFATE 2 MG: 4 INJECTION INTRAVENOUS at 07:03

## 2019-03-14 RX ADMIN — SODIUM CHLORIDE: 0.9 INJECTION, SOLUTION INTRAVENOUS at 10:03

## 2019-03-14 NOTE — PROGRESS NOTES
Patient complaining of severe labor pain. Motor and sensory intact throughout. Epidural position adjusted overnight. @ 15 cc/hr. Bolused several times overnight with no relief.     Epidural @ 8 at skin. Depth of needle 7 cm on placement. Epidural likely not in correct place. Plan to replace. Patient amenable to this plan.

## 2019-03-14 NOTE — L&D DELIVERY NOTE
Delivery Information for  Marti Forrest    Birth information:  YOB: 2018   Time of birth: 1:10 PM   Sex: female   Head Delivery Date/Time: 3/14/2019  1:10 PM   Delivery type: , Low Transverse   Gestational Age: 38w2d    Delivery Providers    Delivering clinician:  Neftali Viera MD   Provider Role    Gardenia Noguera, RN Registered Nurse    Shadia Chery, RN Registered Nurse    Stacia Lei Surgical Tech    Shadia Valles MD Anesthesiologist    Afua Payan MD Anesthesiologist    Leny Man, LPN [facility] Assist            Measurements    Weight:    Length:           Apgars    Living status:  Living  Apgars:   1 min.:   5 min.:   10 min.:   15 min.:   20 min.:     Skin color:   1  1       Heart rate:   2  2       Reflex irritability:   2  2       Muscle tone:   2  2       Respiratory effort:   2  2       Total:   9  9       Apgars assigned by:  SHADIA CHERY RN         Operative Delivery    Forceps attempted?:  No  Vacuum extractor attempted?:  No         Shoulder Dystocia    Shoulder dystocia present?:  No           Presentation    Presentation:  Vertex  Position:  Left Occiput Anterior           Interventions/Resuscitation    Method:  Bulb Suctioning, Tactile Stimulation       Cord    Vessels:  3 vessels  Complications:  None  Delayed Cord Clamping?:  No  Cord Clamped Date/Time:  3/14/2019  1:10 PM  Cord Blood Disposition:  Lab, Sent with Baby  Gases Sent?:  No  Stem Cell Collection (by MD):  No       Placenta    Placenta delivery date/time:  3/14/2019 1310  Placenta removal:  Manual removal  Placenta appearance:  Intact  Placenta disposition:  discarded           Labor Events:       labor: No     Labor Onset Date/Time:         Dilation Complete Date/Time:         Start Pushing Date/Time:       Rupture Date/Time:              Rupture type:           Fluid Amount:        Fluid Color:        Fluid Odor:        Membrane Status (PeriCalm): SRM  (Spontaneous Rupture)      Rupture Date/Time (PeriCalm): 2019 03:20:00      Fluid Amount (PeriCalm): Moderate      Fluid Color (PeriCalm): Clear       steroids:       Antibiotics given for GBS:       Induction: amniotomy     Indications for induction:  Hypertension     Augmentation:       Indications for augmentation:       Labor complications: Failure to Progress in First Stage;Dysfunctional Labor;Fetal Intolerance     Additional complications:          Cervical ripening:                     Delivery:      Episiotomy: None     Indication for Episiotomy:       Perineal Lacerations:   Repaired:      Periurethral Laceration: none Repaired:     Labial Laceration: none Repaired:     Sulcus Laceration: none Repaired:     Vaginal Laceration: No Repaired:     Cervical Laceration: No Repaired:     Repair suture:       Repair # of packets:       Vaginal delivery QBL (mL): 0      QBL (mL): 904     Combined Blood Loss (mL): 904     Vaginal Sweep Performed: Yes     Surgicount Correct: Yes       Other providers:       Anesthesia    Method:  Spinal, Epidural          Details (if applicable):  Trial of Labor Yes    Categorization: Primary    Priority: Emergency   Indications for : Fetal Intolerance of Labor   Incision Type: low transverse     Additional  information:  Forceps:    Vacuum:    Breech:    Observed anomalies    Other (Comments):

## 2019-03-14 NOTE — OP NOTE
DATE OF PROCEDURE:  2019    PREOPERATIVE DIAGNOSIS:  A 38-week gestation.  Induction of labor for elevated   blood pressure, failure to progress, and non-reassuring fetal well being.    POSTOPERATIVE DIAGNOSIS:  A 38-week gestation.  Induction of labor for elevated   blood pressure, failure to progress, and non-reassuring fetal well being.    PROCEDURE:  A primary low transverse  section.    SURGEON:  Neftali Viera M.D.    ASSISTANT:  Saundra Man.    ANESTHESIA:  Epidural.    PROCEDURE IN DETAIL:  The patient was brought to the Operating Room, prepped and   draped in the usual sterile fashion.  A Pfannenstiel skin incision was made,   incision brought down to fascia, fascia nicked in the midline and extended   bilaterally.  The muscle dissected the fascia bluntly and sharply,  in   midline, peritoneum entered sharply.  The vesicoperitoneum was incised, extended   bilaterally and the bladder was dissected off the uterus bluntly.  The uterus   was entered in a low transverse fashion and a female infant, Apgars 9 and 9, was   delivered without any complication.  Nose and mouth were suctioned, cord was   doubly clamped and cut, and the infant was handed off to  physician   present.  Placenta was delivered manually.  Uterus exteriorized, cleared of all   debris and the uterine incision was closed in a running interlocking fashion   with 0 Vicryl suture.  Good hemostasis was noted.  The uterus replaced in the   pelvis.  Pelvis was irrigated with normal saline.  Incision was again examined   and appeared to be hemostatic.  Fascia closed in a running fashion using #1   Vicryl suture.  Skin was closed with staples.  There were no complications.  EBL   for the case was 800 mL.  The patient tolerated the procedure well, taken to   Recovery in stable condition.      CORA/HN  dd: 2019 13:38:53 (CDT)  td: 2019 13:48:29 (CDT)  Doc ID   #[id number]  Job ID #[id number]    CC:

## 2019-03-14 NOTE — NURSING
Report received, care assumed. Pt c/o some mild pressure and cramping pain, encouraged to use the bolus button on epidural pump. States she does not want to get stuck again. Given appropriate reassurance. Pitocin at 12mu/min per pump, ctxs noted q 2 min. Reactive fht's, mod variability, no decels. lr at 125c/hr per pump. Cruz patent, draining clear urine to urometer bag. Poc reviewed with pt, will recheck cervix at 4 hour sue unless indicated earlier.   2030 3cm dil per sve. Pt reports still having some discomfort despite epidural bolus x 2. Anesthesia notified for top-off.  2200 pt reports pain relieved.   0030 dr chung in to give pt another epidural bolus, increase infusion rate and replace the infusion bag. Pt describes pain as a 5. Sve-tight 4cm dil. Posterior -3  0320 srom, clear.  Still- tight 4cm dil. Decreased reactivity after srom, pitocin decreased to 12mu/min until reactivity increases. Pt reports increased pressure sensation, encouraged to use epidural bolus for pain relief. Pads and linens changed, pt repositioned for comfort.  0424 pt turned to her left side per dr chung, another epidural bolus given. Pt reports more sensation in her left leg than right. Monitors adjusted.  0510 prolonged decels and bradycardia after pt lay back down in bed. Pit off, ivf bolus started, 02 started. Turned to left then right. fht's return to baseline on right side. sve per rn, scalp stim. 5cm dil, 90%effaced, head well applied to cervix, no palpable cord.   0530 pt vomiting while on right side. Sve, 6cm dil now.  0625 pt c/o increased pain. Sve, 8cm dil. Anesthesia notified.  0640 anesthesia in with pt.  0705 anesthesia in room, will place a new epidural  0711 pt positioned for new epidural  0711 timeout for new epidural/ spinal   0722 pt vomiting, bp's low. ivf bolus in progress, ephedrine per anesthesia  0729 fse placed by mi orozco. Still 7-8cm dil.  Pt on her left side with o2 in progress

## 2019-03-14 NOTE — ANESTHESIA PROCEDURE NOTES
CSE    Patient location during procedure: OB  Start time: 3/14/2019 7:15 AM  Timeout: 3/14/2019 7:15 AM  End time: 3/14/2019 7:26 AM  Reason for block: at surgeon's request  Staffing  Anesthesiologist: Eugenio Pacheco MD  Resident/CRNA: Anita Valles MD  Performed: anesthesiologist and resident/CRNA   Preanesthetic Checklist  Completed: patient identified, site marked, surgical consent, pre-op evaluation, timeout performed, IV checked, risks and benefits discussed and monitors and equipment checked  CSE  Patient position: sitting  Prep: ChloraPrep  Patient monitoring: heart rate and continuous pulse ox  Approach: midline  Spinal Needle  Needle type: Ania Tucker   Needle gauge: 24 G  Needle length: 5 in  Epidural Needle  Injection technique: RICARDA air  Needle type: Tuohy   Needle length: 4 in  Location: L4-5  Needle localization: anatomical landmarks  Catheter  Catheter type: end hole  Catheter size: 20 G  Catheter at skin depth: 10 cm  Test dose: lidocaine 1.5% with Epi 1-to-200,000 and negative  Assessment  Sensory level: L1

## 2019-03-14 NOTE — ANESTHESIA POSTPROCEDURE EVALUATION
"Anesthesia Post Evaluation    Patient: Dahlia Forrest    Procedure(s) Performed: * No procedures listed *    Final Anesthesia Type: epidural  Patient location during evaluation: labor & delivery  Patient participation: Yes- Able to Participate  Level of consciousness: awake and alert, awake and oriented  Post-procedure vital signs: reviewed and stable  Pain management: adequate  Airway patency: patent  PONV status at discharge: No PONV  Anesthetic complications: no      Cardiovascular status: hemodynamically stable  Respiratory status: unassisted  Hydration status: euvolemic  Follow-up not needed.        Visit Vitals  /79   Pulse 78   Temp 37.2 °C (99 °F) (Oral)   Resp 16   Ht 5' 5" (1.651 m)   Wt 94.3 kg (208 lb)   LMP 06/15/2018 (Approximate)   SpO2 98%   Breastfeeding? Yes   BMI 34.61 kg/m²       Pain/Zari Score: No Data Recorded      "

## 2019-03-15 LAB
BASOPHILS # BLD AUTO: 0.01 K/UL
BASOPHILS NFR BLD: 0.1 %
DIFFERENTIAL METHOD: ABNORMAL
EOSINOPHIL # BLD AUTO: 0.1 K/UL
EOSINOPHIL NFR BLD: 0.5 %
ERYTHROCYTE [DISTWIDTH] IN BLOOD BY AUTOMATED COUNT: 13.8 %
HCT VFR BLD AUTO: 26.2 %
HGB BLD-MCNC: 8.3 G/DL
LYMPHOCYTES # BLD AUTO: 1.9 K/UL
LYMPHOCYTES NFR BLD: 10.4 %
MCH RBC QN AUTO: 27.8 PG
MCHC RBC AUTO-ENTMCNC: 31.7 G/DL
MCV RBC AUTO: 88 FL
MONOCYTES # BLD AUTO: 2 K/UL
MONOCYTES NFR BLD: 10.7 %
NEUTROPHILS # BLD AUTO: 14.5 K/UL
NEUTROPHILS NFR BLD: 78.3 %
PLATELET # BLD AUTO: 261 K/UL
PMV BLD AUTO: 9.8 FL
RBC # BLD AUTO: 2.99 M/UL
WBC # BLD AUTO: 18.64 K/UL

## 2019-03-15 PROCEDURE — 85025 COMPLETE CBC W/AUTO DIFF WBC: CPT

## 2019-03-15 PROCEDURE — 25000003 PHARM REV CODE 250: Performed by: OBSTETRICS & GYNECOLOGY

## 2019-03-15 PROCEDURE — 63600175 PHARM REV CODE 636 W HCPCS: Performed by: ANESTHESIOLOGY

## 2019-03-15 PROCEDURE — 36415 COLL VENOUS BLD VENIPUNCTURE: CPT

## 2019-03-15 PROCEDURE — 11000001 HC ACUTE MED/SURG PRIVATE ROOM

## 2019-03-15 PROCEDURE — 25000003 PHARM REV CODE 250: Performed by: ANESTHESIOLOGY

## 2019-03-15 RX ADMIN — OXYCODONE HYDROCHLORIDE AND ACETAMINOPHEN 1 TABLET: 10; 325 TABLET ORAL at 02:03

## 2019-03-15 RX ADMIN — NAPROXEN 500 MG: 500 TABLET ORAL at 02:03

## 2019-03-15 RX ADMIN — KETOROLAC TROMETHAMINE 30 MG: 30 INJECTION, SOLUTION INTRAMUSCULAR at 12:03

## 2019-03-15 RX ADMIN — MUPIROCIN 1 G: 20 OINTMENT TOPICAL at 08:03

## 2019-03-15 RX ADMIN — OXYCODONE HYDROCHLORIDE AND ACETAMINOPHEN 1 TABLET: 10; 325 TABLET ORAL at 11:03

## 2019-03-15 RX ADMIN — NIFEDIPINE 60 MG: 30 TABLET, FILM COATED, EXTENDED RELEASE ORAL at 08:03

## 2019-03-15 RX ADMIN — ACETAMINOPHEN 650 MG: 325 TABLET ORAL at 12:03

## 2019-03-15 RX ADMIN — KETOROLAC TROMETHAMINE 30 MG: 30 INJECTION, SOLUTION INTRAMUSCULAR at 06:03

## 2019-03-15 RX ADMIN — DOCUSATE SODIUM 200 MG: 100 CAPSULE, LIQUID FILLED ORAL at 08:03

## 2019-03-15 RX ADMIN — OXYCODONE HYDROCHLORIDE AND ACETAMINOPHEN 1 TABLET: 10; 325 TABLET ORAL at 06:03

## 2019-03-15 RX ADMIN — NAPROXEN 500 MG: 500 TABLET ORAL at 11:03

## 2019-03-15 RX ADMIN — OXYCODONE HYDROCHLORIDE AND ACETAMINOPHEN 1 TABLET: 10; 325 TABLET ORAL at 08:03

## 2019-03-15 NOTE — PLAN OF CARE
Problem: Adult Inpatient Plan of Care  Goal: Plan of Care Review  Outcome: Ongoing (interventions implemented as appropriate)  Mother will exclusively breastfeed on cue 8 or more times in 24 hours. Will record voids/stools. Will monitor baby for signs of adequate feedings. Will call for assistance if needed. Family supportive at bedside.        [facility]  Lactation Note - Mom    SUMMARY     Maternal Assessment    Breast Size Issue: none  Breast Shape: Bilateral:, round, pendulous  Breast Density: Bilateral:, soft  Nipples: Bilateral:, everted, graspable(per mom )  Preferred Pain Scale: number (Numeric Rating Pain Scale)  Comfort/Acceptable Pain Level: 6  Pain Body Location - Side: Bilateral  Pain Body Location - Orientation: incisional  Pain Body Location: abdomen  Pain Rating (0-10): Rest: 0  Pain Rating (0-10): Activity: 0  Pain Rating: Rest: 8 - severe pain  Pain Rating: Activity: 0 - no pain  Frequency: intermittent  Quality: aching  Pain Management Interventions: care clustered, pain management plan reviewed with patient/caregiver, pillow support provided, position adjusted, quiet environment facilitated, relaxation techniques promoted  Sleep/Rest/Relaxation: no problem identified, awake  POSS (Pasero Opioid-Induced Sed Scale): 1 - Awake and alert  Fever Reduction/Comfort Measures: lightweight bedding, lightweight clothing    LATCH Score         Breasts WDL    Breast WDL: WDL(per mom )    Maternal Infant Feeding    Maternal Preparation: breast care  Maternal Emotional State: independent, relaxed  Pain with Feeding: no(states mild nipple tenderness, lanolin provided, enc hand ex)  Latch Assistance: other (see comments)(encouraged to call for latch check and assistance PRN)    Lactation Referrals    Lactation Referrals: WIC (women, infants and children) program, pediatric care provider  Pediatric Care Provider Lactation Follow-up Date/Time: f/u in 2-3 days after d/c for weight check  WIC  Lactation Follow-up Date/Time: pt to schedule appt    Lactation Interventions    Breast Care: Breastfeeding: breast milk to nipples, supportive bra utilized, warm shower encouraged, lanolin to nipples  Breastfeeding Assistance: feeding cue recognition promoted, feeding on demand promoted, support offered  Breast Care: Breastfeeding: breast milk to nipples, supportive bra utilized, warm shower encouraged, lanolin to nipples  Breastfeeding Assistance: feeding cue recognition promoted, feeding on demand promoted, support offered  Fetal Wellbeing Promotion: intake and output monitored  Breastfeeding Support: diary/feeding log utilized, encouragement provided, maternal rest encouraged       Breastfeeding Session         Maternal Information

## 2019-03-15 NOTE — PHYSICIAN QUERY
PT Name: Dahlia Forrest  MR #: [id number]     Physician Query Form - Documentation Clarification      CDS/: MY Estrella,RNC-[email or web address]          Contact information:[email or web address]    This form is a permanent document in the medical record.     Query Date: March 15, 2019    By submitting this query, we are merely seeking further clarification of documentation. Please utilize your independent clinical judgment when addressing the question(s) below.    The Medical record reflects the following:    Supporting Clinical Findings Location in Medical Record   IUP at 38 wks  Borderline BP  Admit for delivery  Monitor for PIH    POSTOPERATIVE DIAGNOSIS:  A 38-week gestation.  Induction of labor for elevated   blood pressure, failure to progress, and non-reassuring fetal well being.     PROCEDURE:  A primary low transverse  section    At 38 weeks admitted for induction of labor due to new hypertension    QZ=453/90, 148/95, 147/103, 150/94    NIFEdipine 24 hr tablet 60 mg       Prot/Creat Ratio,Ur=0.18-0.56   H&P 3/13          Op note 3/14                Anesthesia note 3/13      Flowsheet 3/14-3/15    MAR 3/14    LAB 3/13                                                                                      Doctor, Please specify diagnosis or diagnoses associated with above clinical findings.    Provider Use Only      [ x ] Gestational hypertension  [  ] Other, please specify:___________________________________                                                                                                                   [  ] Clinically Undetermined

## 2019-03-15 NOTE — PROGRESS NOTES
Delivery Progress Note  Obstetrics        SUBJECTIVE:     Postpartum Day 1:  Delivery    Ms. Forrest states she feels well. She denies emotional concerns. Her pain is well controlled with current medications. The baby is well. The baby is feeding via breast. The patient is ambulating well. Ms. Forrest is tolerating a normal diet. Flatus has been passed. Urinary output is adequate.    OBJECTIVE:     Vital Signs (Most Recent):  Temp: 98.4 °F (36.9 °C) (03/15/19 1200)  Pulse: 97 (03/15/19 1200)  Resp: 18 (03/15/19 1200)  BP: 128/74 (03/15/19 1200)  SpO2: 97 % (03/15/19 1200)    Vital Signs Range (Last 24H):  Temp:  [97.8 °F (36.6 °C)-100 °F (37.8 °C)]   Pulse:  []   Resp:  [16-18]   BP: (128-151)/()   SpO2:  [97 %-100 %]     I & O (Last 24H):    Intake/Output Summary (Last 24 hours) at 3/15/2019 1604  Last data filed at 3/15/2019 0700  Gross per 24 hour   Intake --   Output 6800 ml   Net -6800 ml     Physical Exam:  General:    no distress   Lungs:  clear to auscultation bilaterally   Heart:  regular rate and rhythm, S1, S2 normal, no murmur, click, rub or gallop   Breasts:  no discharge, erythema, or tenderness   Abdomen:  soft, non-tender; bowel sounds normal   Fundus:  firm   Incision:  healing well   Lochia:   scant rubra   DVT Evaluation:  No evidence of DVT on either side seen on physical exam.     Hemoglobin/Hematocrit  Recent Labs   Lab 03/15/19  0520   HGB 8.3*   HCT 26.2*     ABO/Rh  Lab Results   Component Value Date    [facility]     Rubella  No results for input(s): RUBELLAIGGSC in the last 168 hours.    ASSESSMENT/PLAN:     Status post  section. Doing well postoperatively.     Continue current care.

## 2019-03-16 PROCEDURE — 11000001 HC ACUTE MED/SURG PRIVATE ROOM

## 2019-03-16 PROCEDURE — 25000003 PHARM REV CODE 250: Performed by: OBSTETRICS & GYNECOLOGY

## 2019-03-16 RX ADMIN — NIFEDIPINE 60 MG: 30 TABLET, FILM COATED, EXTENDED RELEASE ORAL at 09:03

## 2019-03-16 RX ADMIN — NAPROXEN 500 MG: 500 TABLET ORAL at 05:03

## 2019-03-16 RX ADMIN — OXYCODONE HYDROCHLORIDE AND ACETAMINOPHEN 1 TABLET: 10; 325 TABLET ORAL at 05:03

## 2019-03-16 RX ADMIN — OXYCODONE HYDROCHLORIDE AND ACETAMINOPHEN 1 TABLET: 5; 325 TABLET ORAL at 05:03

## 2019-03-16 RX ADMIN — DOCUSATE SODIUM 200 MG: 100 CAPSULE, LIQUID FILLED ORAL at 08:03

## 2019-03-16 RX ADMIN — OXYCODONE HYDROCHLORIDE AND ACETAMINOPHEN 1 TABLET: 5; 325 TABLET ORAL at 01:03

## 2019-03-16 RX ADMIN — MUPIROCIN 1 G: 20 OINTMENT TOPICAL at 08:03

## 2019-03-16 NOTE — PROGRESS NOTES
Delivery Progress Note  Obstetrics        SUBJECTIVE:     Postpartum Day 2:  Delivery    Ms. Forrest states she feels well. She denies emotional concerns. Her pain is well controlled with current medications. The baby is well. The baby is feeding via breast. The patient is ambulating well. Ms. Forrest is tolerating a normal diet. Flatus has been passed. Urinary output is adequate.    OBJECTIVE:     Vital Signs (Most Recent):  Temp: 98.1 °F (36.7 °C) (19 0800)  Pulse: 89 (19 0800)  Resp: 18 (19 0800)  BP: 134/86 (19 0800)  SpO2: 100 % (19 08)    Vital Signs Range (Last 24H):  Temp:  [98.1 °F (36.7 °C)-98.4 °F (36.9 °C)]   Pulse:  []   Resp:  [18]   BP: (125-134)/(63-86)   SpO2:  [97 %-100 %]     I & O (Last 24H):No intake or output data in the 24 hours ending 19 1105  Physical Exam:  General:    no distress   Lungs:  clear to auscultation bilaterally   Heart:  regular rate and rhythm, S1, S2 normal, no murmur, click, rub or gallop   Breasts:  no discharge, erythema, or tenderness   Abdomen:  soft, non-tender; bowel sounds normal   Fundus:  firm   Incision:  healing well   Lochia:   scant rubra   DVT Evaluation:  No evidence of DVT on either side seen on physical exam.     Hemoglobin/Hematocrit  Recent Labs   Lab 03/15/19  0520   HGB 8.3*   HCT 26.2*     ABO/Rh  Lab Results   Component Value Date    [facility]     Rubella  No results for input(s): RUBELLAIGGSC in the last 168 hours.    ASSESSMENT/PLAN:     Status post  section. Doing well postoperatively.     Continue current care.

## 2019-03-16 NOTE — DISCHARGE SUMMARY
[facility]  Obstetrics  Discharge Summary      Patient Name: Dahlia Forrest  MRN: [id number]  Admission Date: 3/13/2019  Hospital Length of Stay: 3 days  Discharge Date and Time: No discharge date for patient encounter.  Attending Physician: Neftali Viera MD   Discharging Provider: Neftali Viera MD  Primary Care Provider: Primary Doctor No    HPI: No notes on file    Procedure(s) (LRB):   SECTION (N/A)     Hospital Course:   No notes on file    Consults (From admission, onward)        Status Ordering Provider     Inpatient consult to Lactation  Once     Provider:  (Not yet assigned)    NEFTALI Stephenson          Final Active Diagnoses:    Diagnosis Date Noted POA    PRINCIPAL PROBLEM:  Hypertension affecting pregnancy in third trimester [O16.3] 2019 Yes      Problems Resolved During this Admission:            Feeding Method: bottle    Immunizations     Date Immunization Status Dose Route/Site Given by    19 1507 MMR Incomplete 0.5 mL Subcutaneous/Left deltoid     19 1507 Tdap Incomplete 0.5 mL Intramuscular/Left deltoid           Delivery:    Episiotomy: None   Lacerations:     Repair suture:     Repair # of packets:     Blood loss (ml): 0     Birth information:  YOB: 2018   Time of birth: 1:10 PM   Sex: female   Delivery type: , Low Transverse   Gestational Age: 38w2d    Delivery Clinician:      Other providers:       Additional  information:  Forceps:    Vacuum:    Breech:    Observed anomalies      Living?:           APGARS  One minute Five minutes Ten minutes   Skin color:         Heart rate:         Grimace:         Muscle tone:         Breathing:         Totals: 9  9        Placenta: Delivered:       appearance    Pending Diagnostic Studies:     None          Discharged Condition: good    Disposition: Home or Self Care    Follow Up:  Follow-up Information     Schedule an appointment as soon as possible for a visit with Neftali  MD Maximiliano.    Specialty:  Obstetrics  Why:  For wound re-check  Contact information:  [address] [facility] [place] [id number] [phone]             Neftali Viera MD In 3 weeks.    Specialty:  Obstetrics  Contact information:  [address] [place] [address] [phone]                 Patient Instructions:   No discharge procedures on file.  Medications:  Current Discharge Medication List      CONTINUE these medications which have NOT CHANGED    Details   prenatal vit/iron fum/folic ac (PRENATAL 1+1 ORAL) Take 1 tablet by mouth once daily.             Neftali Viera MD  Obstetrics  [facility]

## 2019-03-16 NOTE — LACTATION NOTE
[facility]  Lactation Note - Mom    SUMMARY     Maternal Assessment    Breast Size Issue: none  Breast Shape: Bilateral:, round, pendulous  Breast Density: Bilateral:, filling  Nipples: Bilateral:, everted, graspable(per mom )  Left Nipple Symptoms: tender(lanolin/gel pads provided)  Right Nipple Symptoms: tender(lanolin/gel pads provided)  Preferred Pain Scale: number (Numeric Rating Pain Scale)  Comfort/Acceptable Pain Level: 2  Pain Body Location - Side: Bilateral  Pain Body Location - Orientation: incisional  Pain Body Location: abdomen  Pain Rating (0-10): Rest: 1  Pain Rating (0-10): Activity: 7  Pain Rating: Rest: 0 - no pain  Pain Rating: Activity: 2 - mild pain  Frequency: intermittent  Quality: burning  Pain Management Interventions: care clustered, pain management plan reviewed with patient/caregiver, position adjusted, pillow support provided, medication offered but refused, quiet environment facilitated, relaxation techniques promoted  Sleep/Rest/Relaxation: awake, sleep interrupted, sleeping between care  POSS (Pasero Opioid-Induced Sed Scale): 1 - Awake and alert  Fever Reduction/Comfort Measures: lightweight bedding, lightweight clothing    LATCH Score         Breasts WDL    Breast WDL: WDL except, nipple symptoms  Left Nipple Symptoms: tender(lanolin/gel pads provided)  Right Nipple Symptoms: tender(lanolin/gel pads provided)    Maternal Infant Feeding    Maternal Preparation: breast care, hand hygiene  Maternal Emotional State: independent, relaxed  Pain with Feeding: other (see comments)(states mild tenderness with initial latch, enc hand expr)  Comfort Measures Following Feeding: air-drying encouraged, expressed milk applied(gel pads)  Nipple Shape After Feeding, Left: (round like a gum drop per mom )  Nipple Shape After Feeding, Right: (round like a gum drop per mom )  Latch Assistance: other (see comments)(offered/declined)    Lactation Referrals    Lactation Referrals: [facility]  (women, infants and children) program, pediatric care provider  Pediatric Care Provider Lactation Follow-up Date/Time: f/u in 2-3 days after d/c for weight check  WIC Lactation Follow-up Date/Time: pt to schedule appt    Lactation Interventions    Breast Care: Breastfeeding: breast milk to nipples, Hydrogel dressing applied, manual expression to soften breast, supportive bra utilized, warm shower encouraged  Breastfeeding Assistance: feeding cue recognition promoted, feeding on demand promoted, support offered  Breast Care: Breastfeeding: breast milk to nipples, Hydrogel dressing applied, manual expression to soften breast, supportive bra utilized, warm shower encouraged  Breastfeeding Assistance: feeding cue recognition promoted, feeding on demand promoted, support offered  Fetal Wellbeing Promotion: intake and output monitored  Breastfeeding Support: encouragement provided, maternal rest encouraged, maternal nutrition promoted, maternal hydration promoted, diary/feeding log utilized       Breastfeeding Session         Maternal Information

## 2019-03-16 NOTE — DISCHARGE INSTRUCTIONS
"Patient Discharge Instructions for Postpartum Women    Resume Regular Diet  Increase activity gradually, no heavy lifting  Shower  No tampons, douching or sexual intercourse.  Discuss birth control options with your physician.  Wear a support bra  Return to work/school when you've been cleared by a physician    Call your physician if     *Fever of 100.4 or higher  *Persistent nausea/ vomiting  *Incisional drainage  *Heavy vaginal bleeding or large clots (Heavy bleeding is soaking 1 pad in an hour)  *Swelling and pain in arms or legs  *Severe headaches, blurred vision or fainting  *Shortness of breath  *Frequency and burning with urination  *Signs of postpartum depression, discuss these signs with your physician    Call lactation services for questions regarding feeding, nipple and breast care, and general questions about lactation.  They can be reached at [phone]         Understanding Postpartum Depression    You've just had a baby.  You know you should be excited and happy.  But instead you find yourself crying for no reason.  You may have trouble coping with your daily tasks.  You feel sad, tired, and hopeless most of the time.  You may even feel ashamed or guilty.  But what you're going through is not your fault and you can feel better.  Talk to your doctor.  He or she can help.    Depression After Childbirth    You may be weepy and tired right after giving birth.  These feelings are normal.  They're sometimes called the "baby blues."  These blues go away 2-3 weeks.  However, postpartum (meaning "after birth") depression lasts much longer and is more sever than the "baby blues."  It can make you feel sad and hopeless.  You may also fear that your baby will be harmed and worry about being a bad mother.      What is Depression?    Depression is a mood disorder that affects the way you think and feel.  The most common symptom is a feeling of deep sadness.  You may also feel as if you just can't cope with life.  "   Other symptoms include:      * Gaining or loosing weight  * Sleeping too much or too little  * Feeling tired all the time  * Feeling restless  * Fears of harming your baby   * Lack of interest in your baby  * Feeling worthless or guilty  * No longer finding pleasure in things you used to  * Having trouble thinking clearly or making decisions  * Thoughts of hurting yourself or your baby    What Causes Postpartum Depression    The exact causes of postpartum depression isn't known.  It may be due to changes in your hormones during and after childbirth.  You may also be tired from caring for your baby and adjusting to being a mother.  All these factors may make you feel depressed.  In some cases, your genes may also play a role.    Depression Can Be Treated    The good news is that there are many ways to treat postpartum depression.  Talking to your doctor is the first step toward feeling better.    Resources:    * National [place] of Mental Health  -- 844.121.1204    www.nimh.nih.gov    * National [place] on Mental Illness --495.937.4717    Www.renate.org    * Mental Health [place] -- 345.528.1251     Www.[facility].org    * National Suicide Hotline --128.702.5931 (800-SUICIDE)    9810-3062 [employer].  All rights reserved.  This information is not intended as a substitute for professional medical care.  Always follow up with your healthcare professional's instructions.          Breastfeeding Discharge Instructions       Feed the baby at the earliest sign of hunger or comfort  o Hands to mouth, sucking motions  o Rooting or searching for something to suck on  o Dont wait for crying - it is a sign of distress     The feedings may be 8-12 times per 24hrs and will not follow a schedule   Avoid pacifiers and bottles for the first 4 weeks   Alternate the breast you start the feeding with, or start with the breast that feels the fullest   Switch breasts when the baby takes himself off the breast or falls  asleep   Keep offering breasts until the baby looks full, no longer gives hunger signs, and stays asleep when placed on his back in the crib   If the baby is sleepy and wont wake for a feeding, put the baby skin-to-skin dressed in a diaper against the mothers bare chest   Sleep near your baby   The baby should be positioned and latched on to the breast correctly  o Chest-to-chest, chin in the breast  o Babys lips are flipped outward  o Babys mouth is stretched open wide like a shout  o Babys sucking should feel like tugging to the mother  - The baby should be drinking at the breast:  o You should hear swallowing or gulping throughout the feeding  o You should see milk on the babys lips when he comes off the breast  o Your breasts should be softer when the baby is finished feeding  o The baby should look relaxed at the end of feedings  o After the 4th day and your milk is in:  o The babys poop should turn bright yellow and be loose, watery, and seedy  o The baby should have at least 3-4 poops the size of the palm of your hand per day  o The baby should have at least 5-6 wet diapers per day  o The urine should be light yellow in color  You should drink when you are thirsty and eat a healthy diet when you are    hungry.     Take naps to get the rest you need.   Take medications and/or drink alcohol only with permission of your obstetrician    or the babys pediatrician.  You can also call the Infant Risk Center,   ([phone]), Monday-Friday, 8am-5pm Central time, to get the most   up-to-date evidence-based information on the use of medications during   pregnancy and breastfeeding.      The baby should be examined by a pediatrician at 3-5 days of age.  Once your   milk comes in, the baby should be gaining at least ½ - 1oz each day and should be back to birthweight no later than 10-14 days of age.          Community Resources    [facility] Breastfeeding Warmline: 111.396.8240  [facility]  clinics: provide incentives and breastpumps to eligible mothers  [employer] (LLLI):  mother-to-mother support group website        www.lll.org  [facility] [employer]he [employer]oses mother-to-mother support groups:        www.llayshaListRunner.ihush.com        [employer] [facility]   Dr. Kevon Hart website for latch videos and general information:        www.breastfeedinginc.ca  Infant Risk Center is a call center that provides information about the safety of taking medications while breastfeeding.  Call 1-255.677.2458, M-F, 8am-5pm, [place].  International Lactation Consultant Association provides resources for assistance:        www.ilca.org  [facility] Breastfeeding Coalition provides informationand resources for parents  and the community    http://[facility].org     Mary Hwang is a mom-to-mom support group:                             www.nodarenO-CODES.ihush.com//breastfeedng-support/  [employer] for Healthy Babies:  1-691-730-BABY(6402)  [employer]: a breastfeeding support group for women of color, 947.360.3921

## 2019-03-16 NOTE — LACTATION NOTE
0930 Attempted to see pt, asleep at this time. Rhythmic rise and fall of chest, respirations unlabored. Informed nurse I attempted to round but pt asleep. No needs or concerns verbalized at this time.

## 2019-03-16 NOTE — PLAN OF CARE
Problem: Adult Inpatient Plan of Care  Goal: Plan of Care Review  Outcome: Ongoing (interventions implemented as appropriate)  POC reviewed with pt around 0805; verbalized acceptance and understanding.  Pt's VS stable.  Remains free from falls and injury.  Pain controlled with ordered meds.  Bonding well with baby.  Baby tolerating feedings; voiding/stooling appropriately.  Exclusively breastfeeding.  Family at bedside to offer support.  WOO.

## 2019-03-17 VITALS
OXYGEN SATURATION: 99 % | TEMPERATURE: 97 F | HEIGHT: 65 IN | HEART RATE: 107 BPM | WEIGHT: 208 LBS | BODY MASS INDEX: 34.66 KG/M2 | DIASTOLIC BLOOD PRESSURE: 76 MMHG | RESPIRATION RATE: 18 BRPM | SYSTOLIC BLOOD PRESSURE: 127 MMHG

## 2019-03-17 PROCEDURE — 25000003 PHARM REV CODE 250: Performed by: OBSTETRICS & GYNECOLOGY

## 2019-03-17 RX ADMIN — NAPROXEN 500 MG: 500 TABLET ORAL at 09:03

## 2019-03-17 RX ADMIN — OXYCODONE HYDROCHLORIDE AND ACETAMINOPHEN 1 TABLET: 10; 325 TABLET ORAL at 01:03

## 2019-03-17 RX ADMIN — OXYCODONE HYDROCHLORIDE AND ACETAMINOPHEN 1 TABLET: 5; 325 TABLET ORAL at 09:03

## 2019-03-17 RX ADMIN — NIFEDIPINE 60 MG: 30 TABLET, FILM COATED, EXTENDED RELEASE ORAL at 09:03

## 2019-03-17 RX ADMIN — MUPIROCIN 1 G: 20 OINTMENT TOPICAL at 09:03

## 2019-03-17 RX ADMIN — DOCUSATE SODIUM 200 MG: 100 CAPSULE, LIQUID FILLED ORAL at 09:03

## 2019-03-17 NOTE — PLAN OF CARE
Problem: Adult Inpatient Plan of Care  Goal: Plan of Care Review  Outcome: Ongoing (interventions implemented as appropriate)  POC reviewed with pt around 0735; verbalized acceptance and understanding.  Pt's VS stable.  Remains free from falls and injury.  Pain controlled with ordered meds.  Bonding well with baby.  Baby tolerating feedings; voiding/stooling appropriately.  Exclusively breastfeeding.  Family at bedside to offer support.    Staples removed without difficulty; steri strips placed per orders.       Discharge instructions given verbally and in writing at 1015.  Verbalized understanding.  Received Mother-Baby care guide during hospital stay.  Prescriptions written and stapled to d/c instructions with explanation for use.  Verbalized understanding.  CDC vaccine information statement given and risk/benifits of vaccine discussed.  Tdap and flu vaccines refused per pt. request.  States she feels comfortable taking care of baby and has demonstrated ability to care for  and herself.  Says she will have assistance when she returns home.  To be discharged to home in stable condition via wheelchair with infant in arms once done breastfeeding.  WCTM.

## 2019-03-17 NOTE — PROGRESS NOTES
Delivery Progress Note  Obstetrics        SUBJECTIVE:     Postpartum Day 3:  Delivery    Ms. Forrest states she feels well. She denies emotional concerns. Her pain is well controlled with current medications. The baby is well. The baby is feeding via breast. The patient is ambulating well. Ms. Forrest is tolerating a normal diet. Flatus has been passed. Urinary output is adequate.    OBJECTIVE:     Vital Signs (Most Recent):  Temp: 97.4 °F (36.3 °C) (19 0800)  Pulse: 107 (19 0800)  Resp: 18 (19 0800)  BP: 127/76 (19 0800)  SpO2: 99 % (19 0800)    Vital Signs Range (Last 24H):  Temp:  [97.4 °F (36.3 °C)-98.9 °F (37.2 °C)]   Pulse:  []   Resp:  [18]   BP: (127-136)/(69-90)   SpO2:  [99 %-100 %]     I & O (Last 24H):No intake or output data in the 24 hours ending 19 9917  Physical Exam:  General:    no distress   Lungs:  clear to auscultation bilaterally   Heart:  regular rate and rhythm, S1, S2 normal, no murmur, click, rub or gallop   Breasts:  no discharge, erythema, or tenderness   Abdomen:  soft, non-tender; bowel sounds normal   Fundus:  firm   Incision:  healing well   Lochia:   scant rubra   DVT Evaluation:  No evidence of DVT on either side seen on physical exam.     Hemoglobin/Hematocrit  Recent Labs   Lab 03/15/19  0520   HGB 8.3*   HCT 26.2*     ABO/Rh  Lab Results   Component Value Date    [facility]     Rubella  No results for input(s): RUBELLAIGGSC in the last 168 hours.    ASSESSMENT/PLAN:     Status post  section. Doing well postoperatively.     Discharge home with standard precautions and return to clinic in 4-6 weeks.

## 2019-03-20 ENCOUNTER — HOSPITAL ENCOUNTER (OUTPATIENT)
Facility: HOSPITAL | Age: 29
Discharge: HOME | End: 2019-03-20
Attending: OBSTETRICS & GYNECOLOGY | Admitting: OBSTETRICS & GYNECOLOGY
Payer: MEDICAID

## 2019-03-20 VITALS — HEART RATE: 106 BPM | SYSTOLIC BLOOD PRESSURE: 155 MMHG | DIASTOLIC BLOOD PRESSURE: 93 MMHG

## 2019-03-20 DIAGNOSIS — R03.0 ELEVATED BLOOD PRESSURE READING: ICD-10-CM

## 2019-03-20 LAB
ALBUMIN SERPL BCP-MCNC: 3 G/DL
ALP SERPL-CCNC: 103 U/L
ALT SERPL W/O P-5'-P-CCNC: 64 U/L
ANION GAP SERPL CALC-SCNC: 8 MMOL/L
ANISOCYTOSIS BLD QL SMEAR: SLIGHT
APTT BLDCRRT: 30.3 SEC
AST SERPL-CCNC: 28 U/L
BASOPHILS # BLD AUTO: 0.04 K/UL
BASOPHILS NFR BLD: 0.4 %
BILIRUB SERPL-MCNC: 0.4 MG/DL
BILIRUB UR QL STRIP: NEGATIVE
BUN SERPL-MCNC: 12 MG/DL
CALCIUM SERPL-MCNC: 9.3 MG/DL
CHLORIDE SERPL-SCNC: 107 MMOL/L
CLARITY UR: CLEAR
CO2 SERPL-SCNC: 23 MMOL/L
COLOR UR: YELLOW
CREAT SERPL-MCNC: 0.8 MG/DL
DIFFERENTIAL METHOD: ABNORMAL
EOSINOPHIL # BLD AUTO: 0.2 K/UL
EOSINOPHIL NFR BLD: 2.2 %
ERYTHROCYTE [DISTWIDTH] IN BLOOD BY AUTOMATED COUNT: 13.4 %
EST. GFR  (AFRICAN AMERICAN): >60 ML/MIN/1.73 M^2
EST. GFR  (NON AFRICAN AMERICAN): >60 ML/MIN/1.73 M^2
GLUCOSE SERPL-MCNC: 82 MG/DL
GLUCOSE UR QL STRIP: NEGATIVE
HCT VFR BLD AUTO: 29.4 %
HGB BLD-MCNC: 9.2 G/DL
HGB UR QL STRIP: ABNORMAL
INR PPP: 1
KETONES UR QL STRIP: ABNORMAL
LEUKOCYTE ESTERASE UR QL STRIP: NEGATIVE
LYMPHOCYTES # BLD AUTO: 2.1 K/UL
LYMPHOCYTES NFR BLD: 20.4 %
MCH RBC QN AUTO: 27.3 PG
MCHC RBC AUTO-ENTMCNC: 31.3 G/DL
MCV RBC AUTO: 87 FL
MONOCYTES # BLD AUTO: 0.9 K/UL
MONOCYTES NFR BLD: 8.8 %
NEUTROPHILS # BLD AUTO: 7.1 K/UL
NEUTROPHILS NFR BLD: 68.2 %
NITRITE UR QL STRIP: NEGATIVE
PH UR STRIP: 7 [PH] (ref 5–8)
PLATELET # BLD AUTO: 496 K/UL
PLATELET BLD QL SMEAR: ABNORMAL
PMV BLD AUTO: 9.8 FL
POIKILOCYTOSIS BLD QL SMEAR: SLIGHT
POLYCHROMASIA BLD QL SMEAR: ABNORMAL
POTASSIUM SERPL-SCNC: 4 MMOL/L
PROT SERPL-MCNC: 7.6 G/DL
PROT UR QL STRIP: ABNORMAL
PROTHROMBIN TIME: 10 SEC
RBC # BLD AUTO: 3.37 M/UL
SODIUM SERPL-SCNC: 138 MMOL/L
SP GR UR STRIP: 1.01 (ref 1–1.03)
URN SPEC COLLECT METH UR: ABNORMAL
UROBILINOGEN UR STRIP-ACNC: ABNORMAL EU/DL
WBC # BLD AUTO: 10.42 K/UL

## 2019-03-20 PROCEDURE — 36415 COLL VENOUS BLD VENIPUNCTURE: CPT

## 2019-03-20 PROCEDURE — 85730 THROMBOPLASTIN TIME PARTIAL: CPT

## 2019-03-20 PROCEDURE — 85610 PROTHROMBIN TIME: CPT

## 2019-03-20 PROCEDURE — 81003 URINALYSIS AUTO W/O SCOPE: CPT

## 2019-03-20 PROCEDURE — 93005 ELECTROCARDIOGRAM TRACING: CPT

## 2019-03-20 PROCEDURE — 99211 OFF/OP EST MAY X REQ PHY/QHP: CPT | Mod: 25

## 2019-03-20 PROCEDURE — 85025 COMPLETE CBC W/AUTO DIFF WBC: CPT

## 2019-03-20 PROCEDURE — 93010 EKG 12-LEAD: ICD-10-PCS | Mod: ,,, | Performed by: INTERNAL MEDICINE

## 2019-03-20 PROCEDURE — 93010 ELECTROCARDIOGRAM REPORT: CPT | Mod: ,,, | Performed by: INTERNAL MEDICINE

## 2019-03-20 PROCEDURE — 80053 COMPREHEN METABOLIC PANEL: CPT

## 2019-03-20 PROCEDURE — 25500020 PHARM REV CODE 255: Performed by: OBSTETRICS & GYNECOLOGY

## 2019-03-20 RX ADMIN — IOHEXOL 75 ML: 350 INJECTION, SOLUTION INTRAVENOUS at 09:03

## 2019-03-21 NOTE — DISCHARGE INSTRUCTIONS
Continue to take procardia as prescribed. Follow up with Dr. Viera in office this week. Call your OB office if you experience any of the following symptoms: headache unrelieved by tylenol, blurred vision, dizziness or excessive and sudden swelling of the hands or feet.

## 2019-03-21 NOTE — NURSING
1900- Pt arrived w/ c/o elevated BP at home today. Pt of Dr. duarte s/p C/S on 3/14. Pt states she is currently taking procardia 60 mg daily for PIH. Pt states she missed her dose yesterday but took it at midnight and again at 6pm tonight. Pt c/o h/a and visual disturbances today. Pt also c/o incisional burning. Bp on arrival 145/90. Will get serial BPs. No edema noted. DTRs normal.     1930- Dr. Duarte paged via answer service.  1942- Dr. Duarte returned page. Notified of pts arrival. Pp c/s on 3/14 w/ elevated bps. Bps on arrival 150s/90s. Pt c/o h/a and visual disturbances. Pt also c/o incisional pain. Incision is open/ not approximated. No drainage noted. Pt afebrile. DTRs normal. Pt states she missed procardia dose yesterday but took it at [place] and again at 6pm tonight. Orders noted to get UA, Pt, PTT, cbc, cmp, EKG and CT head c/ contrast. Requested Bp meds and tylenol for h/a. Md wants labs and CT done first.   2051- EKG completed.  2109- Pt sent to CT.  2239- Dr. duarte given update. Lab, EKG and CT results reviewed. Bps reviewed. Pt states h/a has subsided. Orders noted to d/c home and f/u in office this week.   2304- Pt given d/c instrcutions. Verbalized understanding. Instructed to f/u with Dr. Duarte this week. Pt ambulated off unit w/ father.

## 2020-03-13 ENCOUNTER — HOSPITAL ENCOUNTER (EMERGENCY)
Facility: HOSPITAL | Age: 30
Discharge: HOME | End: 2020-03-13
Attending: EMERGENCY MEDICINE
Payer: MEDICAID

## 2020-03-13 VITALS
WEIGHT: 210 LBS | SYSTOLIC BLOOD PRESSURE: 135 MMHG | BODY MASS INDEX: 34.99 KG/M2 | RESPIRATION RATE: 18 BRPM | HEART RATE: 75 BPM | DIASTOLIC BLOOD PRESSURE: 88 MMHG | OXYGEN SATURATION: 99 % | HEIGHT: 65 IN | TEMPERATURE: 98 F

## 2020-03-13 DIAGNOSIS — R10.31 RIGHT LOWER QUADRANT ABDOMINAL PAIN: ICD-10-CM

## 2020-03-13 DIAGNOSIS — N30.00 ACUTE CYSTITIS WITHOUT HEMATURIA: Primary | ICD-10-CM

## 2020-03-13 LAB
ALBUMIN SERPL BCP-MCNC: 4.2 G/DL (ref 3.5–5.2)
ALP SERPL-CCNC: 44 U/L (ref 38–126)
ALT SERPL W/O P-5'-P-CCNC: 17 U/L (ref 10–44)
ANION GAP SERPL CALC-SCNC: 8 MMOL/L (ref 8–16)
AST SERPL-CCNC: 20 U/L (ref 15–46)
B-HCG UR QL: NEGATIVE
BACTERIA #/AREA URNS AUTO: ABNORMAL /HPF
BASOPHILS # BLD AUTO: 0.03 K/UL (ref 0–0.2)
BASOPHILS NFR BLD: 0.5 % (ref 0–1.9)
BILIRUB SERPL-MCNC: 0.5 MG/DL (ref 0.1–1)
BILIRUB UR QL STRIP: NEGATIVE
BUN SERPL-MCNC: 12 MG/DL (ref 7–17)
CALCIUM SERPL-MCNC: 8.7 MG/DL (ref 8.7–10.5)
CHLORIDE SERPL-SCNC: 107 MMOL/L (ref 95–110)
CLARITY UR REFRACT.AUTO: ABNORMAL
CO2 SERPL-SCNC: 25 MMOL/L (ref 23–29)
COLOR UR AUTO: ABNORMAL
CREAT SERPL-MCNC: 0.83 MG/DL (ref 0.5–1.4)
DIFFERENTIAL METHOD: ABNORMAL
EOSINOPHIL # BLD AUTO: 0.2 K/UL (ref 0–0.5)
EOSINOPHIL NFR BLD: 2.4 % (ref 0–8)
ERYTHROCYTE [DISTWIDTH] IN BLOOD BY AUTOMATED COUNT: 12.2 % (ref 11.5–14.5)
EST. GFR  (AFRICAN AMERICAN): >60 ML/MIN/1.73 M^2
EST. GFR  (NON AFRICAN AMERICAN): >60 ML/MIN/1.73 M^2
GLUCOSE SERPL-MCNC: 93 MG/DL (ref 70–110)
GLUCOSE UR QL STRIP: NEGATIVE
HCT VFR BLD AUTO: 35.7 % (ref 37–48.5)
HGB BLD-MCNC: 11.3 G/DL (ref 12–16)
HGB UR QL STRIP: ABNORMAL
HYALINE CASTS UR QL AUTO: 0 /LPF
IMM GRANULOCYTES # BLD AUTO: 0.02 K/UL (ref 0–0.04)
IMM GRANULOCYTES NFR BLD AUTO: 0.3 % (ref 0–0.5)
KETONES UR QL STRIP: ABNORMAL
LEUKOCYTE ESTERASE UR QL STRIP: ABNORMAL
LYMPHOCYTES # BLD AUTO: 2.5 K/UL (ref 1–4.8)
LYMPHOCYTES NFR BLD: 37.5 % (ref 18–48)
MCH RBC QN AUTO: 27.4 PG (ref 27–31)
MCHC RBC AUTO-ENTMCNC: 31.7 G/DL (ref 32–36)
MCV RBC AUTO: 87 FL (ref 82–98)
MICROSCOPIC COMMENT: ABNORMAL
MONOCYTES # BLD AUTO: 0.5 K/UL (ref 0.3–1)
MONOCYTES NFR BLD: 7.9 % (ref 4–15)
NEUTROPHILS # BLD AUTO: 3.4 K/UL (ref 1.8–7.7)
NEUTROPHILS NFR BLD: 51.4 % (ref 38–73)
NITRITE UR QL STRIP: NEGATIVE
NRBC BLD-RTO: 0 /100 WBC
PH UR STRIP: 5 [PH] (ref 5–8)
PLATELET # BLD AUTO: 309 K/UL (ref 150–350)
PMV BLD AUTO: 10.3 FL (ref 9.2–12.9)
POTASSIUM SERPL-SCNC: 3.7 MMOL/L (ref 3.5–5.1)
PROT SERPL-MCNC: 7.5 G/DL (ref 6–8.4)
PROT UR QL STRIP: ABNORMAL
RBC # BLD AUTO: 4.12 M/UL (ref 4–5.4)
RBC #/AREA URNS AUTO: >100 /HPF (ref 0–4)
SODIUM SERPL-SCNC: 140 MMOL/L (ref 136–145)
SP GR UR STRIP: 1.02 (ref 1–1.03)
URN SPEC COLLECT METH UR: ABNORMAL
UROBILINOGEN UR STRIP-ACNC: 1 EU/DL
WBC # BLD AUTO: 6.59 K/UL (ref 3.9–12.7)
WBC #/AREA URNS AUTO: 2 /HPF (ref 0–5)

## 2020-03-13 PROCEDURE — 81025 URINE PREGNANCY TEST: CPT | Mod: ER

## 2020-03-13 PROCEDURE — 85025 COMPLETE CBC W/AUTO DIFF WBC: CPT | Mod: ER

## 2020-03-13 PROCEDURE — 99284 EMERGENCY DEPT VISIT MOD MDM: CPT | Mod: 25,ER

## 2020-03-13 PROCEDURE — 80053 COMPREHEN METABOLIC PANEL: CPT | Mod: ER

## 2020-03-13 PROCEDURE — 63600175 PHARM REV CODE 636 W HCPCS: Mod: ER | Performed by: PHYSICIAN ASSISTANT

## 2020-03-13 PROCEDURE — 81000 URINALYSIS NONAUTO W/SCOPE: CPT | Mod: ER

## 2020-03-13 PROCEDURE — 96374 THER/PROPH/DIAG INJ IV PUSH: CPT | Mod: ER

## 2020-03-13 RX ORDER — KETOROLAC TROMETHAMINE 30 MG/ML
15 INJECTION, SOLUTION INTRAMUSCULAR; INTRAVENOUS
Status: COMPLETED | OUTPATIENT
Start: 2020-03-13 | End: 2020-03-13

## 2020-03-13 RX ORDER — NITROFURANTOIN 25; 75 MG/1; MG/1
100 CAPSULE ORAL 2 TIMES DAILY
Qty: 20 CAPSULE | Refills: 0 | Status: SHIPPED | OUTPATIENT
Start: 2020-03-13 | End: 2020-03-23

## 2020-03-13 RX ADMIN — KETOROLAC TROMETHAMINE 15 MG: 30 INJECTION, SOLUTION INTRAMUSCULAR at 04:03

## 2020-03-13 NOTE — ED PROVIDER NOTES
"Encounter Date: 3/13/2020       History     Chief Complaint   Patient presents with    Abdominal Pain     Pt with c/o lower abdominal described as constatn cramp "that don't go away" since yesterday. (+)nausea     Patient is a a 29 year old female presenting with constant, moderate right lower quadrant abdominal cramping since last night. She reports that initially the pain was severe and now is only moderate. There are no exacerbating or relieving factors. No urinary symptoms. She denies any sexual activity for 1 year. She is on her menstrual period and denies previous vaginal discharge. No fever or chills. No nausea, vomiting, diarrhea or constipation. Last bowel movement was yesterday. No treatment prior to arrival.         Review of patient's allergies indicates:  No Known Allergies  Past Medical History:   Diagnosis Date    Hypertension affecting pregnancy in third trimester 3/13/2019    Nerve palsy     left arm     Past Surgical History:   Procedure Laterality Date     SECTION N/A 3/14/2019    Procedure:  SECTION;  Surgeon: Neftali Viera MD;  [facility]: [facility];  Service: OB/GYN;  Laterality: N/A;     History reviewed. No pertinent family history.  Social History     Tobacco Use    Smoking status: Never Smoker    Smokeless tobacco: Never Used   Substance Use Topics    Alcohol use: No     Frequency: Never    Drug use: No     Review of Systems   Constitutional: Negative for activity change, appetite change, chills, fatigue and fever.   HENT: Negative for congestion, ear pain, rhinorrhea, sore throat and voice change.    Respiratory: Negative for cough, shortness of breath and wheezing.    Cardiovascular: Negative for chest pain.   Gastrointestinal: Positive for abdominal pain. Negative for diarrhea, nausea and vomiting.   Genitourinary: Negative for dysuria, flank pain, frequency and hematuria.   Musculoskeletal: Negative for back pain, neck pain and neck stiffness.   Skin: Negative for " rash.   Neurological: Negative for dizziness and headaches.   All other systems reviewed and are negative.      Physical Exam     Initial Vitals [03/13/20 1506]   BP Pulse Resp Temp SpO2   (!) 149/92 75 18 98.3 °F (36.8 °C) 99 %      MAP       --         Physical Exam    Nursing note and vitals reviewed.  Constitutional: She appears well-developed and well-nourished. She appears distressed.   HENT:   Head: Normocephalic.   Nose: Nose normal.   Mouth/Throat: Oropharynx is clear and moist.   Eyes: Conjunctivae and EOM are normal. Pupils are equal, round, and reactive to light.   Neck: Normal range of motion. Neck supple.   Cardiovascular: Normal rate, regular rhythm, normal heart sounds and intact distal pulses.   Pulmonary/Chest: Breath sounds normal. No respiratory distress.   Abdominal: Soft. Bowel sounds are normal. There is tenderness.   Mild tenderness in the right lower quadrant. No rebound tenderness or guarding.    Musculoskeletal: She exhibits no edema.   Lymphadenopathy:     She has no cervical adenopathy.   Neurological: She is alert and oriented to person, place, and time.   Skin: Skin is warm and dry. No rash noted.   Psychiatric: She has a normal mood and affect. Her behavior is normal. Judgment and thought content normal.         ED Course   Procedures  Labs Reviewed   CBC W/ AUTO DIFFERENTIAL - Abnormal; Notable for the following components:       Result Value    Hemoglobin 11.3 (*)     Hematocrit 35.7 (*)     Mean Corpuscular Hemoglobin Conc 31.7 (*)     All other components within normal limits   URINALYSIS, REFLEX TO URINE CULTURE - Abnormal; Notable for the following components:    Appearance, UA Cloudy (*)     Protein, UA 1+ (*)     Ketones, UA 1+ (*)     Occult Blood UA 3+ (*)     Leukocytes, UA 1+ (*)     All other components within normal limits    Narrative:     Preferred Collection Type->Urine, Clean Catch   URINALYSIS MICROSCOPIC - Abnormal; Notable for the following components:    RBC, UA  >100 (*)     Bacteria Few (*)     All other components within normal limits    Narrative:     Preferred Collection Type->Urine, Clean Catch   COMPREHENSIVE METABOLIC PANEL   PREGNANCY TEST, URINE RAPID          Imaging Results          US Pelvis Complete Non OB (Final result)  Result time 03/13/20 16:05:22    Final result by TRISTEN Laughlin Sr., MD (03/13/20 16:05:22)                 Impression:      1. The left ovary was not visualized.  2. The uterus and right ovary are normal in appearance.      Electronically signed by: Quincy Laughlin MD  Date:    03/13/2020  Time:    16:05             Narrative:    EXAMINATION:  US PELVIS COMPLETE NON OB    CLINICAL HISTORY:  right lower quadrant pain; rule out torsion;    TECHNIQUE:  Multiple static ultrasound images are submitted for interpretation.    COMPARISON:  None    FINDINGS:  The uterus is normal in appearance.  It measures 9.0 cm in craniocaudal dimension by 4.1 cm in AP dimension by 6.0 cm in mediolateral dimension.  The endometrial stripe thickness measures 4 mm.    The left ovary was not visualized.  The right ovary is normal in appearance.  It measures 4.3 cm x 2.3 cm x 3.2 cm.  It has arterial flow with a peak systolic velocity of 18 centimeters/second.    There is no free fluid visualized within the pelvis.                                 Medical Decision Making:   Clinical Tests:   Lab Tests: Ordered and Reviewed  The following lab test(s) were unremarkable: CBC, CMP, Urinalysis and UPT  Radiological Study: Ordered and Reviewed  No evidence of torsion or other acute findings on ultrasound. Repeat abdominal exam had improved. Patient only had mild pain. Labs unremarkable other than bacteria on urinalysis. Rx for macrobid and advised on supportive care and follow up. I offered CT of the abdomen, but patient prefers to wait, monitor symptoms and will return if worse in any way. She understands we cannot completely rule out appendicitis.                                   Clinical Impression:       ICD-10-CM ICD-9-CM   1. Acute cystitis without hematuria N30.00 595.0   2. Right lower quadrant abdominal pain R10.31 789.03         Disposition:   Disposition: Discharged     ED Disposition Condition    Discharge Stable        ED Prescriptions     Medication Sig Dispense Start Date End Date Auth. Provider    nitrofurantoin, macrocrystal-monohydrate, (MACROBID) 100 MG capsule Take 1 capsule (100 mg total) by mouth 2 (two) times daily. for 10 days 20 capsule 3/13/2020 3/23/2020 MARZENA Hall        Follow-up Information    None                                    MARZENA Hall  03/13/20 2474

## 2020-06-09 ENCOUNTER — HOSPITAL ENCOUNTER (EMERGENCY)
Facility: HOSPITAL | Age: 30
Discharge: HOME | End: 2020-06-09
Attending: EMERGENCY MEDICINE
Payer: MEDICAID

## 2020-06-09 VITALS
HEIGHT: 65 IN | WEIGHT: 200 LBS | DIASTOLIC BLOOD PRESSURE: 89 MMHG | BODY MASS INDEX: 33.32 KG/M2 | HEART RATE: 70 BPM | TEMPERATURE: 98 F | RESPIRATION RATE: 20 BRPM | SYSTOLIC BLOOD PRESSURE: 135 MMHG | OXYGEN SATURATION: 100 %

## 2020-06-09 DIAGNOSIS — N30.00 ACUTE CYSTITIS WITHOUT HEMATURIA: Primary | ICD-10-CM

## 2020-06-09 LAB
B-HCG UR QL: NEGATIVE
BACTERIA #/AREA URNS AUTO: ABNORMAL /HPF
BILIRUB UR QL STRIP: NEGATIVE
CLARITY UR REFRACT.AUTO: ABNORMAL
COLOR UR AUTO: ABNORMAL
GLUCOSE UR QL STRIP: NEGATIVE
HGB UR QL STRIP: ABNORMAL
HYALINE CASTS UR QL AUTO: 0 /LPF
KETONES UR QL STRIP: NEGATIVE
LEUKOCYTE ESTERASE UR QL STRIP: ABNORMAL
MICROSCOPIC COMMENT: ABNORMAL
NITRITE UR QL STRIP: NEGATIVE
PH UR STRIP: 5 [PH] (ref 5–8)
PROT UR QL STRIP: ABNORMAL
RBC #/AREA URNS AUTO: 2 /HPF (ref 0–4)
SP GR UR STRIP: 1.03 (ref 1–1.03)
URN SPEC COLLECT METH UR: ABNORMAL
UROBILINOGEN UR STRIP-ACNC: 1 EU/DL
WBC #/AREA URNS AUTO: 6 /HPF (ref 0–5)

## 2020-06-09 PROCEDURE — 99283 EMERGENCY DEPT VISIT LOW MDM: CPT | Mod: ER

## 2020-06-09 PROCEDURE — 81000 URINALYSIS NONAUTO W/SCOPE: CPT | Mod: ER

## 2020-06-09 PROCEDURE — 81025 URINE PREGNANCY TEST: CPT | Mod: ER

## 2020-06-09 RX ORDER — CEPHALEXIN 500 MG/1
500 CAPSULE ORAL EVERY 12 HOURS
Qty: 14 CAPSULE | Refills: 0 | Status: SHIPPED | OUTPATIENT
Start: 2020-06-09 | End: 2020-06-16

## 2020-06-09 NOTE — ED PROVIDER NOTES
"Encounter Date: 2020       History     Chief Complaint   Patient presents with    Abdominal Pain     Pt c/o lower right sided abdominal pain since last night.  Denies burning with urination, denies vaginal d/c, denies n/v/d.  Pt states "I think I have an infection, I used a dildo."       Pt is a 29 yo AAF who presents to the ED with the complaint of RLQ pain. Pt states that she used a dildo last evening and since that time she has been having sharp pains to the RL abdomen region. She denies any nausea, vomiting, diarrhea, fever or chills. She states that the last time she used a dildo it caused her to have a UTI. She does not think that she has a retained foreign body.         Review of patient's allergies indicates:  No Known Allergies  Past Medical History:   Diagnosis Date    Hypertension affecting pregnancy in third trimester 3/13/2019    Nerve palsy     left arm     Past Surgical History:   Procedure Laterality Date     SECTION N/A 3/14/2019    Procedure:  SECTION;  Surgeon: Neftali Viera MD;  Location: [facility];  Service: OB/GYN;  Laterality: N/A;     History reviewed. No pertinent family history.  Social History     Tobacco Use    Smoking status: Never Smoker    Smokeless tobacco: Never Used   Substance Use Topics    Alcohol use: No     Frequency: Never    Drug use: No     Review of Systems   Constitutional: Negative for appetite change, chills and fever.   HENT: Negative for congestion and facial swelling.    Eyes: Negative for discharge and itching.   Respiratory: Negative for cough and shortness of breath.    Cardiovascular: Negative for chest pain, palpitations and leg swelling.   Gastrointestinal: Positive for abdominal pain. Negative for constipation, diarrhea, nausea and vomiting.   Genitourinary: Positive for pelvic pain. Negative for flank pain, urgency, vaginal bleeding, vaginal discharge and vaginal pain.   Musculoskeletal: Negative for arthralgias and back pain. "   Neurological: Negative for dizziness and headaches.   Psychiatric/Behavioral: Negative for agitation and confusion.       Physical Exam     Initial Vitals [06/09/20 0905]   BP Pulse Resp Temp SpO2   (!) 140/99 82 16 98.1 °F (36.7 °C) 99 %      MAP       --         Physical Exam    Nursing note and vitals reviewed.  Constitutional: She appears well-developed and well-nourished.   Eyes: EOM are normal. Pupils are equal, round, and reactive to light.   Neck: Normal range of motion. Neck supple.   Cardiovascular: Normal rate, regular rhythm, normal heart sounds and intact distal pulses.   Pulmonary/Chest: Breath sounds normal.   Abdominal: Soft. Bowel sounds are normal.   Mild RLQ TTP  No rebound  No guarding  No peritoneal signs    Genitourinary:   Genitourinary Comments: Pt declined pelvic exam.    Musculoskeletal: Normal range of motion.   Neurological: She is alert and oriented to person, place, and time.   Skin: Skin is warm and dry.         ED Course   Procedures  Labs Reviewed - No data to display       Imaging Results    None          Medical Decision Making:   ED Management:  - pt refusing blood work, imaging   - UA suggestive of urinary tract infection  - I explained the risks vs benefits of doing and not doing the aforementioned workup in light of her presenting complaint, but pt did not wish to pursue further investigation. I explained to the patient that not allowing us to do further lab testing, imaging may prevent us from diagnosing a life-threatening illness or ailment.  Pt with capacity to make medical decisions. I offered CT of the abdomen, but patient declined. She states that she would prefer to monitor her symptoms and that she will return if symptoms worsen.  The  patient verbalized to me that she understands I cannot completely rule out appendicitis without further workup.   - pt given strict return precautions for any new or worsening symptoms  - pt to f/u with her PCP in next 2-3 days as  outpatient                     ED Course as of Jun 09 1316   Tue Jun 09, 2020   1009 Pt refusing blood work, imaging, and pelvic exam. Only consents to do urinalysis. I explained the risks vs benefits of doing and not doing the aforementioned workup in light of her presenting complaint, but pt did not wish to pursue further investigation. Pt with capacity to make medical decisions. I offered CT of the abdomen, but patient declined. She states that she would prefer to monitor her symptoms and that she will return if symptoms worsen.  The  patient verbalized to me that she understands I cannot completely rule out appendicitis without further workup.     [LC]      ED Course User Index  [LC] Renan Wolfe MD                Clinical Impression:       ICD-10-CM ICD-9-CM   1. Acute cystitis without hematuria N30.00 595.0                                Renan Wolfe MD  06/09/20 7917

## 2020-06-22 NOTE — ED NOTES
MD informed patient refusing blood work, ultrasound and pelvic exam. Patient states that Im having this pain from my sex toy that anabell been using. Patient states that same thing happen the last time I used it.    SUBJECTIVE & OVERNIGHT EVENTS:  Patient seen and examined.  using 3 L NC O2, saturating well. discussed with patient and therapy to taper down O2 slowly and monitor SO2.   patient with no new complaints. Alert, participating well in therapy. Curtis lift for transfers.         [X] Constitutional WNL        [X] Cardio WNL              [X] Resp WNL  [] GI +PEG                            []  +ESRD, anuric                    [X] Heme WNL  [X] Endo WNL                       [] Skin +sacral decub                  [] MSK +LUE swelling  [X] Neuro WNL                     [X] Cognitive WNL         [X] Psych WN    OBJECTIVE   Vital Signs Last 24 Hrs  T(C): 36.7 (22 Jun 2020 07:13), Max: 37.3 (21 Jun 2020 20:33)  T(F): 98 (22 Jun 2020 07:13), Max: 99.1 (21 Jun 2020 20:33)  HR: 72 (22 Jun 2020 07:13) (72 - 89)  BP: 131/79 (22 Jun 2020 07:13) (131/79 - 181/77)  BP(mean): --  RR: 16 (22 Jun 2020 07:13) (16 - 17)  SpO2: 100% (22 Jun 2020 07:13) (94% - 100%)      PHYSICAL EXAM   Constitutional: NAD  HEENT: NCAT, EOMI, handling secretions well  Cardio: well-perfused  Resp: symmetric chest-rise, no increased WOB  GI: non-distended; +PEG  : no zavala  Extremities: well-perfused; +Right AVF with palpable thrill. LUE with fibrotic-type tissue in axilla and non-pitting edema throughout arm.  Hand with 2+pitting edema.    Skin: full thickness skin loss with slough and grey tissue over sacrum.  firm/tender surrounding area.           LABS:                                                                                       9.2    21.93 )-----------( 485      ( 20 Jun 2020 15:10 )             28.5     06-20    126<L>  |  91<L>  |  71<H>  ----------------------------<  125<H>  4.3   |  29  |  3.00<H>    Ca    9.9      20 Jun 2020 15:10  Phos  2.3     06-20    TPro  6.0  /  Alb  1.3<L>  /  TBili  0.7  /  DBili  x   /  AST  23  /  ALT  17  /  AlkPhos  273<H>  06-20                 < from: CT Pelvis No Cont (06.15.20 @ 09:45) >  IMPRESSION:     Linear tract of air overlying the sacrum/coccyx extending into the right gluteal region without a drainable collection. Correlate for recent intervention versus decubitus ulcer.      < from: Xray Chest 1 View- PORTABLE-Urgent (06.15.20 @ 08:59) >  IMPRESSION:  Worsening left basilar opacity consistent with small effusion and consolidation as previously observed by CT.    < end of copied text >     MEDICATIONS  (STANDING):  ascorbic acid 500 milliGRAM(s) Oral two times a day  chlorhexidine 4% Liquid 1 Application(s) Topical daily  cloNIDine Patch 0.3 mG/24Hr(s) 1 patch Transdermal every 7 days  diphenhydrAMINE   Injectable 25 milliGRAM(s) IV Push <User Schedule>  epoetin norris-epbx (RETACRIT) Injectable 35674 Unit(s) IV Push <User Schedule>  gabapentin   Solution 100 milliGRAM(s) Oral two times a day  hydrALAZINE 100 milliGRAM(s) Oral every 12 hours  labetalol 200 milliGRAM(s) Oral every 12 hours  lactobacillus acidophilus 1 Tablet(s) Oral daily  losartan 100 milliGRAM(s) Oral at bedtime  melatonin 9 milliGRAM(s) Oral at bedtime  mirtazapine 15 milliGRAM(s) Oral at bedtime  pantoprazole   Suspension 40 milliGRAM(s) Oral two times a day    MEDICATIONS  (PRN):  acetaminophen    Suspension .. 650 milliGRAM(s) Oral every 6 hours PRN Mild Pain (1 - 3)  guaiFENesin   Syrup  (Sugar-Free) 100 milliGRAM(s) Oral every 6 hours PRN Cough  hemorrhoidal Ointment 1 Application(s) Rectal two times a day PRN rectal pain  oxyCODONE    IR 5 milliGRAM(s) Oral every 4 hours PRN Moderate Pain (4 - 6)  polyethylene glycol 3350 17 Gram(s) Oral daily PRN Constipation

## 2022-06-19 ENCOUNTER — HOSPITAL ENCOUNTER (EMERGENCY)
Facility: HOSPITAL | Age: 32
Discharge: HOME | End: 2022-06-19
Attending: EMERGENCY MEDICINE
Payer: MEDICAID

## 2022-06-19 DIAGNOSIS — O21.9 NAUSEA AND VOMITING IN PREGNANCY: Primary | ICD-10-CM

## 2022-06-19 LAB
ALBUMIN SERPL BCP-MCNC: 4.2 G/DL (ref 3.5–5.2)
ALP SERPL-CCNC: 47 U/L (ref 38–126)
ALT SERPL W/O P-5'-P-CCNC: 15 U/L (ref 10–44)
ANION GAP SERPL CALC-SCNC: 10 MMOL/L (ref 8–16)
AST SERPL-CCNC: 22 U/L (ref 15–46)
BACTERIA #/AREA URNS AUTO: ABNORMAL /HPF
BASOPHILS # BLD AUTO: 0.03 K/UL (ref 0–0.2)
BASOPHILS NFR BLD: 0.3 % (ref 0–1.9)
BILIRUB SERPL-MCNC: 0.7 MG/DL (ref 0.1–1)
BILIRUB UR QL STRIP: NEGATIVE
CALCIUM SERPL-MCNC: 8.9 MG/DL (ref 8.7–10.5)
CHLORIDE SERPL-SCNC: 105 MMOL/L (ref 95–110)
CLARITY UR REFRACT.AUTO: CLEAR
CO2 SERPL-SCNC: 20 MMOL/L (ref 23–29)
COLOR UR AUTO: ABNORMAL
CREAT SERPL-MCNC: 0.71 MG/DL (ref 0.5–1.4)
DIFFERENTIAL METHOD: ABNORMAL
EOSINOPHIL # BLD AUTO: 0 K/UL (ref 0–0.5)
EOSINOPHIL NFR BLD: 0.3 % (ref 0–8)
ERYTHROCYTE [DISTWIDTH] IN BLOOD BY AUTOMATED COUNT: 13.4 % (ref 11.5–14.5)
EST. GFR  (AFRICAN AMERICAN): >60 ML/MIN/1.73 M^2
EST. GFR  (NON AFRICAN AMERICAN): >60 ML/MIN/1.73 M^2
GLUCOSE SERPL-MCNC: 92 MG/DL (ref 70–110)
GLUCOSE UR QL STRIP: NEGATIVE
HCT VFR BLD AUTO: 31.4 % (ref 37–48.5)
HGB BLD-MCNC: 10.4 G/DL (ref 12–16)
HGB UR QL STRIP: NEGATIVE
HYALINE CASTS UR QL AUTO: 1 /LPF
IMM GRANULOCYTES # BLD AUTO: 0.04 K/UL (ref 0–0.04)
IMM GRANULOCYTES NFR BLD AUTO: 0.4 % (ref 0–0.5)
KETONES UR QL STRIP: ABNORMAL
LEUKOCYTE ESTERASE UR QL STRIP: ABNORMAL
LIPASE SERPL-CCNC: 30 U/L (ref 23–300)
LYMPHOCYTES # BLD AUTO: 2 K/UL (ref 1–4.8)
LYMPHOCYTES NFR BLD: 19.6 % (ref 18–48)
MCH RBC QN AUTO: 27.7 PG (ref 27–31)
MCHC RBC AUTO-ENTMCNC: 33.1 G/DL (ref 32–36)
MCV RBC AUTO: 84 FL (ref 82–98)
MICROSCOPIC COMMENT: ABNORMAL
MONOCYTES # BLD AUTO: 0.7 K/UL (ref 0.3–1)
MONOCYTES NFR BLD: 6.6 % (ref 4–15)
NEUTROPHILS # BLD AUTO: 7.4 K/UL (ref 1.8–7.7)
NEUTROPHILS NFR BLD: 72.8 % (ref 38–73)
NITRITE UR QL STRIP: NEGATIVE
NRBC BLD-RTO: 0 /100 WBC
PH UR STRIP: 7 [PH] (ref 5–8)
PLATELET # BLD AUTO: 330 K/UL (ref 150–450)
PMV BLD AUTO: 10.5 FL (ref 9.2–12.9)
POTASSIUM SERPL-SCNC: 3.3 MMOL/L (ref 3.5–5.1)
PROT SERPL-MCNC: 7.3 G/DL (ref 6–8.4)
PROT UR QL STRIP: ABNORMAL
RBC # BLD AUTO: 3.76 M/UL (ref 4–5.4)
RBC #/AREA URNS AUTO: 1 /HPF (ref 0–4)
SODIUM SERPL-SCNC: 135 MMOL/L (ref 136–145)
SP GR UR STRIP: 1.01 (ref 1–1.03)
URN SPEC COLLECT METH UR: ABNORMAL
UROBILINOGEN UR STRIP-ACNC: 1 EU/DL
UUN UR-MCNC: 8 MG/DL (ref 7–17)
WBC # BLD AUTO: 10.11 K/UL (ref 3.9–12.7)
WBC #/AREA URNS AUTO: 3 /HPF (ref 0–5)

## 2022-06-19 PROCEDURE — 80053 COMPREHEN METABOLIC PANEL: CPT | Mod: ER | Performed by: EMERGENCY MEDICINE

## 2022-06-19 PROCEDURE — 85025 COMPLETE CBC W/AUTO DIFF WBC: CPT | Mod: ER | Performed by: EMERGENCY MEDICINE

## 2022-06-19 PROCEDURE — 96374 THER/PROPH/DIAG INJ IV PUSH: CPT | Mod: ER

## 2022-06-19 PROCEDURE — 81000 URINALYSIS NONAUTO W/SCOPE: CPT | Mod: ER | Performed by: EMERGENCY MEDICINE

## 2022-06-19 PROCEDURE — 96361 HYDRATE IV INFUSION ADD-ON: CPT | Mod: ER

## 2022-06-19 PROCEDURE — 99284 EMERGENCY DEPT VISIT MOD MDM: CPT | Mod: 25,ER

## 2022-06-19 PROCEDURE — 83690 ASSAY OF LIPASE: CPT | Mod: ER | Performed by: EMERGENCY MEDICINE

## 2022-06-19 PROCEDURE — 63600175 PHARM REV CODE 636 W HCPCS: Mod: ER | Performed by: EMERGENCY MEDICINE

## 2022-06-19 PROCEDURE — 25000003 PHARM REV CODE 250: Mod: ER | Performed by: EMERGENCY MEDICINE

## 2022-06-19 RX ORDER — ONDANSETRON 2 MG/ML
4 INJECTION INTRAMUSCULAR; INTRAVENOUS
Status: COMPLETED | OUTPATIENT
Start: 2022-06-19 | End: 2022-06-19

## 2022-06-19 RX ORDER — ONDANSETRON 4 MG/1
4 TABLET, FILM COATED ORAL EVERY 6 HOURS PRN
Qty: 12 TABLET | Refills: 0 | Status: SHIPPED | OUTPATIENT
Start: 2022-06-19

## 2022-06-19 RX ADMIN — SODIUM CHLORIDE 1000 ML: 0.9 INJECTION, SOLUTION INTRAVENOUS at 09:06

## 2022-06-19 RX ADMIN — ONDANSETRON 4 MG: 2 INJECTION INTRAMUSCULAR; INTRAVENOUS at 09:06

## 2022-06-20 VITALS
OXYGEN SATURATION: 98 % | WEIGHT: 212 LBS | SYSTOLIC BLOOD PRESSURE: 140 MMHG | TEMPERATURE: 98 F | HEIGHT: 63 IN | HEART RATE: 63 BPM | RESPIRATION RATE: 18 BRPM | DIASTOLIC BLOOD PRESSURE: 78 MMHG | BODY MASS INDEX: 37.56 KG/M2

## 2022-06-20 NOTE — ED NOTES
APPEARANCE: Pt clean and well groomed with clothes appropriately fastened. No distress is noted.    NEURO: Pt is awake and alert x3, Pt follows commands and affect is appropriate. Pt is moving all extremities well and sensation is intact. Speech is clear.    RESPIRATORY: Respirations are even and unlabored on room air. No accessory muscle use noted. Normal rate and effort is noted. Pt placed on continuous pulse ox with O2 sats noted at  98    CARDIAC: Pt placed on cardiac monitor. Sinus rhythm noted. Rate is normal. No abnormal heart sounds noted. Radial and dorsalis pedis pulses are palpable and +2. No edema noted. Cap refill is < 3    GASTRO: Pt denies abdominal pain/tenderness. States bowel movements have been regular. Bowel sounds are present and normal.     : Pt denies any pain or frequency with urination.    SKIN: Skin is warm, dry and intact. Skin color is appropriate for ethnicity. Normal skin turgor noted. Mucous membranes are moist.     MUSCULOSKELETAL: No abnormalities are noted.    Pt presents to ER with c/o nausea and vomiting for a few days. Just found she is 4wks pregnant. No fever or diarrhea.   Denies any blood in her vomit. States with her last 2 pregnancies she vomited the entire term.

## 2022-06-20 NOTE — ED PROVIDER NOTES
Encounter Date: 2022       History     Chief Complaint   Patient presents with    Vomiting     Pt approx 3-4 weeks pregnant and has been vomiting x 3 days. Denies cough/fever/diarrhea.      HPI   32 y.o.   approx 4 weeks GA, has had hyperemesis with prior preg  No abd pain, lof, vb, diarrhea  Sx x 3 days, +emesis nb    Review of patient's allergies indicates:  No Known Allergies  Past Medical History:   Diagnosis Date    Hypertension affecting pregnancy in third trimester 3/13/2019    Nerve palsy     left arm     Past Surgical History:   Procedure Laterality Date     SECTION N/A 3/14/2019    Procedure:  SECTION;  Surgeon: Neftali Viera MD;  Location: [facility] L&D;  Service: OB/GYN;  Laterality: N/A;     History reviewed. No pertinent family history.  Social History     Tobacco Use    Smoking status: Never Smoker    Smokeless tobacco: Never Used   Substance Use Topics    Alcohol use: No    Drug use: No     Review of Systems  All systems were reviewed/examined and were negative except as noted in the HPI.    Physical Exam     Initial Vitals [22]   BP Pulse Resp Temp SpO2   (!) 145/92 76 17 98.3 °F (36.8 °C) 100 %      MAP       --         Physical Exam    General: the patient is awake, alert, and in no apparent distress.  Head: normocephalic and atraumatic, sclera are clear  OP wnl  Neck: supple without meningismus  Chest: no respiratory distress  Heart: regular rate and rhythm  ABD soft, nontender, nondistended, no peritoneal signs  Back nt  Extremities: warm and well perfused  Skin: warm and dry  Psych conversant  Neuro: awake, alert, moving all extremities      ED Course   Procedures  Labs Reviewed   CBC W/ AUTO DIFFERENTIAL - Abnormal; Notable for the following components:       Result Value    RBC 3.76 (*)     Hemoglobin 10.4 (*)     Hematocrit 31.4 (*)     All other components within normal limits   COMPREHENSIVE METABOLIC PANEL - Abnormal; Notable for the following  components:    Sodium 135 (*)     Potassium 3.3 (*)     CO2 20 (*)     All other components within normal limits   URINALYSIS, REFLEX TO URINE CULTURE - Abnormal; Notable for the following components:    Protein, UA 1+ (*)     Ketones, UA 3+ (*)     Leukocytes, UA 2+ (*)     All other components within normal limits    Narrative:     Preferred Collection Type->Urine, Clean Catch  Specimen Source->Urine  Collection Type->Urine, Clean Catch   URINALYSIS MICROSCOPIC - Abnormal; Notable for the following components:    Bacteria Few (*)     All other components within normal limits    Narrative:     Preferred Collection Type->Urine, Clean Catch  Specimen Source->Urine  Collection Type->Urine, Clean Catch   LIPASE          Imaging Results    None          Medications   sodium chloride 0.9% bolus 1,000 mL (0 mLs Intravenous Stopped 6/19/22 2253)   ondansetron injection 4 mg (4 mg Intravenous Given 6/19/22 2112)          Medical Decision Making:    This is an emergent evaluation of a patient presenting to the ED.  Nursing notes were reviewed.  Non critical  I personally reviewed and interpreted the laboratory results.    I decided to obtain and review old medical records, which showed: ED visits    Improved    Evaluation for Emergency Medical Condition  The patient received a medical screening exam and within a reasonable degree of clinical confidence an emergency medical condition has not been identified.  The patient is instructed on proper follow up and return precautions to the ED.    The patient was encouraged strongly to get the COVID-19 vaccine either after asymptomatic (if COVID positive) or offered it here in the ED is COVID negative.      Efra Jefferson MD, FRANCISCO                   Clinical Impression:   Final diagnoses:  [O21.9] Nausea and vomiting in pregnancy (Primary)          ED Disposition Condition    Discharge Stable        ED Prescriptions     Medication Sig Dispense Start Date End Date Auth. Provider     ondansetron (ZOFRAN) 4 MG tablet Take 1 tablet (4 mg total) by mouth every 6 (six) hours as needed for Nausea. 12 tablet 6/19/2022  Lucian Jefferson MD        Follow-up Information     Follow up With Specialties Details Why Contact Info    Your doctor  Schedule an appointment as soon as possible for a visit           Discharged to home in stable condition, return to ED warnings given, follow up and patient care instructions given.      Efra Jefferson MD, FRANCISCO, FACEP  Department of Emergency Medicine       Lucian Jefferson MD  06/23/22 [id number]

## 2022-06-24 LAB
INFLUENZA A, MOLECULAR: NEGATIVE
INFLUENZA B, MOLECULAR: NEGATIVE
SARS-COV-2 RDRP RESP QL NAA+PROBE: NEGATIVE
SPECIMEN SOURCE: NORMAL

## 2022-06-24 PROCEDURE — 87502 INFLUENZA DNA AMP PROBE: CPT | Performed by: EMERGENCY MEDICINE

## 2022-06-24 PROCEDURE — 96365 THER/PROPH/DIAG IV INF INIT: CPT

## 2022-06-24 PROCEDURE — 96361 HYDRATE IV INFUSION ADD-ON: CPT

## 2022-06-24 PROCEDURE — U0002 COVID-19 LAB TEST NON-CDC: HCPCS | Performed by: EMERGENCY MEDICINE

## 2022-06-24 PROCEDURE — 99284 EMERGENCY DEPT VISIT MOD MDM: CPT | Mod: 25

## 2022-06-24 PROCEDURE — 87502 INFLUENZA DNA AMP PROBE: CPT

## 2022-06-25 ENCOUNTER — HOSPITAL ENCOUNTER (EMERGENCY)
Facility: HOSPITAL | Age: 32
Discharge: HOME | End: 2022-06-25
Attending: EMERGENCY MEDICINE
Payer: MEDICAID

## 2022-06-25 VITALS
HEIGHT: 65 IN | HEART RATE: 70 BPM | RESPIRATION RATE: 16 BRPM | BODY MASS INDEX: 34.99 KG/M2 | DIASTOLIC BLOOD PRESSURE: 75 MMHG | WEIGHT: 210 LBS | SYSTOLIC BLOOD PRESSURE: 121 MMHG | OXYGEN SATURATION: 100 % | TEMPERATURE: 99 F

## 2022-06-25 DIAGNOSIS — O21.0 HYPEREMESIS GRAVIDARUM: Primary | ICD-10-CM

## 2022-06-25 DIAGNOSIS — O23.41 URINARY TRACT INFECTION IN MOTHER DURING FIRST TRIMESTER OF PREGNANCY: ICD-10-CM

## 2022-06-25 LAB
ALBUMIN SERPL BCP-MCNC: 3.9 G/DL (ref 3.5–5.2)
ALP SERPL-CCNC: 44 U/L (ref 55–135)
ALT SERPL W/O P-5'-P-CCNC: 11 U/L (ref 10–44)
ANION GAP SERPL CALC-SCNC: 9 MMOL/L (ref 8–16)
AST SERPL-CCNC: 11 U/L (ref 10–40)
BACTERIA #/AREA URNS HPF: NORMAL /HPF
BASOPHILS # BLD AUTO: 0.03 K/UL (ref 0–0.2)
BASOPHILS NFR BLD: 0.3 % (ref 0–1.9)
BILIRUB SERPL-MCNC: 0.6 MG/DL (ref 0.1–1)
BILIRUB UR QL STRIP: NEGATIVE
BUN SERPL-MCNC: 10 MG/DL (ref 6–20)
CALCIUM SERPL-MCNC: 9.5 MG/DL (ref 8.7–10.5)
CHLORIDE SERPL-SCNC: 104 MMOL/L (ref 95–110)
CLARITY UR: ABNORMAL
CO2 SERPL-SCNC: 24 MMOL/L (ref 23–29)
COLOR UR: YELLOW
CREAT SERPL-MCNC: 0.9 MG/DL (ref 0.5–1.4)
DIFFERENTIAL METHOD: ABNORMAL
EOSINOPHIL # BLD AUTO: 0.1 K/UL (ref 0–0.5)
EOSINOPHIL NFR BLD: 0.5 % (ref 0–8)
ERYTHROCYTE [DISTWIDTH] IN BLOOD BY AUTOMATED COUNT: 13.5 % (ref 11.5–14.5)
EST. GFR  (AFRICAN AMERICAN): >60 ML/MIN/1.73 M^2
EST. GFR  (NON AFRICAN AMERICAN): >60 ML/MIN/1.73 M^2
GLUCOSE SERPL-MCNC: 132 MG/DL (ref 70–110)
GLUCOSE UR QL STRIP: NEGATIVE
HCT VFR BLD AUTO: 33.6 % (ref 37–48.5)
HGB BLD-MCNC: 11 G/DL (ref 12–16)
HGB UR QL STRIP: NEGATIVE
HYALINE CASTS #/AREA URNS LPF: 0 /LPF
IMM GRANULOCYTES # BLD AUTO: 0.04 K/UL (ref 0–0.04)
IMM GRANULOCYTES NFR BLD AUTO: 0.4 % (ref 0–0.5)
KETONES UR QL STRIP: ABNORMAL
LEUKOCYTE ESTERASE UR QL STRIP: ABNORMAL
LYMPHOCYTES # BLD AUTO: 2 K/UL (ref 1–4.8)
LYMPHOCYTES NFR BLD: 18.9 % (ref 18–48)
MCH RBC QN AUTO: 27.5 PG (ref 27–31)
MCHC RBC AUTO-ENTMCNC: 32.7 G/DL (ref 32–36)
MCV RBC AUTO: 84 FL (ref 82–98)
MICROSCOPIC COMMENT: NORMAL
MONOCYTES # BLD AUTO: 0.7 K/UL (ref 0.3–1)
MONOCYTES NFR BLD: 6 % (ref 4–15)
NEUTROPHILS # BLD AUTO: 8 K/UL (ref 1.8–7.7)
NEUTROPHILS NFR BLD: 73.9 % (ref 38–73)
NITRITE UR QL STRIP: NEGATIVE
NRBC BLD-RTO: 0 /100 WBC
PH UR STRIP: 7 [PH] (ref 5–8)
PLATELET # BLD AUTO: 360 K/UL (ref 150–450)
PMV BLD AUTO: 10.7 FL (ref 9.2–12.9)
POTASSIUM SERPL-SCNC: 3 MMOL/L (ref 3.5–5.1)
PROT SERPL-MCNC: 7.8 G/DL (ref 6–8.4)
PROT UR QL STRIP: ABNORMAL
RBC # BLD AUTO: 4 M/UL (ref 4–5.4)
RBC #/AREA URNS HPF: 2 /HPF (ref 0–4)
SODIUM SERPL-SCNC: 137 MMOL/L (ref 136–145)
SP GR UR STRIP: >1.03 (ref 1–1.03)
SQUAMOUS #/AREA URNS HPF: 4 /HPF
URN SPEC COLLECT METH UR: ABNORMAL
UROBILINOGEN UR STRIP-ACNC: >=8 EU/DL
WBC # BLD AUTO: 10.77 K/UL (ref 3.9–12.7)
WBC #/AREA URNS HPF: 5 /HPF (ref 0–5)

## 2022-06-25 PROCEDURE — 81000 URINALYSIS NONAUTO W/SCOPE: CPT | Performed by: EMERGENCY MEDICINE

## 2022-06-25 PROCEDURE — 85025 COMPLETE CBC W/AUTO DIFF WBC: CPT | Performed by: EMERGENCY MEDICINE

## 2022-06-25 PROCEDURE — 25000003 PHARM REV CODE 250: Performed by: EMERGENCY MEDICINE

## 2022-06-25 PROCEDURE — 63600175 PHARM REV CODE 636 W HCPCS: Performed by: EMERGENCY MEDICINE

## 2022-06-25 PROCEDURE — 80053 COMPREHEN METABOLIC PANEL: CPT | Performed by: EMERGENCY MEDICINE

## 2022-06-25 RX ORDER — CEPHALEXIN 500 MG/1
500 CAPSULE ORAL
Status: DISCONTINUED | OUTPATIENT
Start: 2022-06-25 | End: 2022-06-25 | Stop reason: HOSPADM

## 2022-06-25 RX ORDER — PROMETHAZINE HYDROCHLORIDE 25 MG/1
25 TABLET ORAL EVERY 6 HOURS PRN
Qty: 15 TABLET | Refills: 0 | Status: SHIPPED | OUTPATIENT
Start: 2022-06-25

## 2022-06-25 RX ORDER — NITROFURANTOIN 25; 75 MG/1; MG/1
100 CAPSULE ORAL 2 TIMES DAILY
Qty: 9 CAPSULE | Refills: 0 | Status: SHIPPED | OUTPATIENT
Start: 2022-06-25 | End: 2022-06-30

## 2022-06-25 RX ADMIN — SODIUM CHLORIDE 1000 ML: 0.9 INJECTION, SOLUTION INTRAVENOUS at 01:06

## 2022-06-25 RX ADMIN — PROMETHAZINE HYDROCHLORIDE 25 MG: 25 INJECTION INTRAMUSCULAR; INTRAVENOUS at 01:06

## 2022-06-25 NOTE — ED PROVIDER NOTES
Encounter Date: 2022    SCRIBE #1 NOTE: I, Liam Jefferson, am scribing for, and in the presence of, Dr. Tinsley.       History     Chief Complaint   Patient presents with    COVID-19 Concerns     Patient presents to the ED with reports of having chills, headache, N/V, and not feeling well.      Dahlia Forrest is a 32 y.o. female with a past medical history of Hypertension affecting pregnancy in third trimester (3/13/2019) and Nerve palsy. The patient presents to the emergency department for evaluation of COVID-19 Concerns. She reports chills, subjective fever, diaphoresis, nausea, vomiting, and generalized body aches that started 1 week ago. She reports she is 6 weeks pregnant and has been taking 8 mg Zofran without relief of her nausea and vomiting. She reports that she has not been able to keep food down for the last week. She also complains of vaginal discharge but denies bleeding. Patient was referred to the ED by her OBGYN.       The history is provided by the patient. No  was used.     Review of patient's allergies indicates:  No Known Allergies  Past Medical History:   Diagnosis Date    Hypertension affecting pregnancy in third trimester 3/13/2019    Nerve palsy     left arm     Past Surgical History:   Procedure Laterality Date     SECTION N/A 3/14/2019    Procedure:  SECTION;  Surgeon: Neftali Viera MD;  [facility]: [facility];  Service: OB/GYN;  Laterality: N/A;     No family history on file.  Social History     Tobacco Use    Smoking status: Never Smoker    Smokeless tobacco: Never Used   Substance Use Topics    Alcohol use: No    Drug use: No     Review of Systems   Constitutional: Positive for chills, diaphoresis and fever.   HENT: Negative for sore throat.    Eyes: Negative for redness.   Respiratory: Negative for shortness of breath.    Cardiovascular: Negative for chest pain.   Gastrointestinal: Positive for nausea and vomiting. Negative for abdominal  pain and diarrhea.   Genitourinary: Positive for vaginal discharge. Negative for vaginal bleeding.   Musculoskeletal: Positive for myalgias.   Skin: Negative for rash.   Neurological: Negative for headaches.   All other systems reviewed and are negative.      Physical Exam     Initial Vitals [06/24/22 2201]   BP Pulse Resp Temp SpO2   122/66 66 18 99.1 °F (37.3 °C) 99 %      MAP       --         Physical Exam    Constitutional: Vital signs are normal. She appears well-developed and well-nourished.   HENT:   Head: Normocephalic and atraumatic.   Eyes: Conjunctivae and EOM are normal. Pupils are equal, round, and reactive to light.   Neck:   Normal range of motion.  Cardiovascular: Normal rate, regular rhythm and normal heart sounds.   Pulmonary/Chest: Breath sounds normal. No respiratory distress.   Abdominal: Abdomen is soft. There is no abdominal tenderness. There is no rebound.   Musculoskeletal:      Cervical back: Normal range of motion.     Neurological: She is alert and oriented to person, place, and time. She has normal strength. She is not disoriented. GCS score is 15. GCS eye subscore is 4. GCS verbal subscore is 5. GCS motor subscore is 6.   Skin: Skin is warm and dry. Capillary refill takes less than 2 seconds.   Psychiatric: She has a normal mood and affect. Thought content normal.         ED Course   Procedures  Labs Reviewed   CBC W/ AUTO DIFFERENTIAL - Abnormal; Notable for the following components:       Result Value    Hemoglobin 11.0 (*)     Hematocrit 33.6 (*)     Gran # (ANC) 8.0 (*)     Gran % 73.9 (*)     All other components within normal limits   COMPREHENSIVE METABOLIC PANEL - Abnormal; Notable for the following components:    Potassium 3.0 (*)     Glucose 132 (*)     Alkaline Phosphatase 44 (*)     All other components within normal limits   URINALYSIS, REFLEX TO URINE CULTURE - Abnormal; Notable for the following components:    Appearance, UA Hazy (*)     Specific Gravity, UA >1.030 (*)      Protein, UA 2+ (*)     Ketones, UA 3+ (*)     Urobilinogen, UA >=8.0 (*)     Leukocytes, UA 2+ (*)     All other components within normal limits    Narrative:     Specimen Source->Urine   INFLUENZA A & B BY MOLECULAR   SARS-COV-2 RNA AMPLIFICATION, QUAL   URINALYSIS MICROSCOPIC    Narrative:     Specimen Source->Urine          Imaging Results    None          Medications   cephALEXin capsule 500 mg (has no administration in time range)   sodium chloride 0.9% bolus 1,000 mL (1,000 mLs Intravenous New Bag 6/25/22 0139)   promethazine (PHENERGAN) 25 mg in dextrose 5 % 50 mL IVPB (25 mg Intravenous New Bag 6/25/22 0136)     Medical Decision Making:   Initial Assessment:   Pleasant 32-year-old female presents the ER for evaluation of hyperemesis gravidarum.  Saw her OBGYN yesterday.  Diagnosed with vaginitis given intravaginal Flagyl.  She was given Zofran for her nausea vomiting advised return to the ER if her symptoms not improved.  She reports she still nauseous with decreased oral intake.  Has not had a proper appetite for over a week per patient.  Came to the ER no acute distress well appearing.  She does have various happy in food in her room.  Bedside ultrasound performed by myself revealed a viable intrauterine pregnancy.  With appropriate heart rate.  Will plan blood work to assess for dehydration JULIO CESAR.  Will plan symptomatic support and reassess.  Clinical Tests:   Lab Tests: Ordered and Reviewed          Scribe Attestation:   Scribe #1: I performed the above scribed service and the documentation accurately describes the services I performed. I attest to the accuracy of the note.        ED Course as of 06/25/22 0307   Sat Jun 25, 2022   0223 Resting comfortably in bed, no acute distress.  Patient feeling better tolerating p.o..  Labs  [SE]      ED Course User Index  [SE] Nomi Tinsley MD             Clinical Impression:   Final diagnoses:  [O21.0] Hyperemesis gravidarum (Primary)  [O23.41] Urinary tract  infection in mother during first trimester of pregnancy          ED Disposition Condition    Discharge Stable        ED Prescriptions     Medication Sig Dispense Start Date End Date Auth. Provider    nitrofurantoin, macrocrystal-monohydrate, (MACROBID) 100 MG capsule Take 1 capsule (100 mg total) by mouth 2 (two) times daily. for 5 days 9 capsule 6/25/2022 6/30/2022 Nomi Tinsley MD    promethazine (PHENERGAN) 25 MG tablet Take 1 tablet (25 mg total) by mouth every 6 (six) hours as needed for Nausea. 15 tablet 6/25/2022  Nomi Tinsley MD        Follow-up Information     Follow up With Specialties Details Why Contact Info Additional Information    [facility] Family Medicine Schedule an appointment as soon as possible for a visit  As needed [address] [place], [address]  [facility] [address] [phone] Please park in [address] C or D and use [address]. Take Medical Office Bldg. elevators.            My Scribe Attestation: I acknowledge that the documentation on this chart was provided by described on the date of service noted above and that the documentation in the chart accurately reflects work and decisions made by me alone.       Nomi Tinsley MD  06/25/22 3856

## 2022-06-25 NOTE — DISCHARGE INSTRUCTIONS
Please take medication as discussed.  Please take antibiotics.  Follow-up with primary care return to the ER for any concerning reason.

## 2022-06-25 NOTE — Clinical Note
"Dahlia "Dahliajose Forrest was seen and treated in our emergency department on 6/24/2022.  She may return to work on 06/26/2022.       If you have any questions or concerns, please don't hesitate to call.      Nomi Tinsley MD"

## 2022-06-27 ENCOUNTER — PATIENT OUTREACH (OUTPATIENT)
Dept: EMERGENCY MEDICINE | Facility: HOSPITAL | Age: 32
End: 2022-06-27
Payer: MEDICAID

## 2022-07-08 ENCOUNTER — HOSPITAL ENCOUNTER (EMERGENCY)
Facility: HOSPITAL | Age: 32
Discharge: HOME | End: 2022-07-09
Attending: EMERGENCY MEDICINE
Payer: MEDICAID

## 2022-07-08 DIAGNOSIS — O21.0 HYPEREMESIS GRAVIDARUM: Primary | ICD-10-CM

## 2022-07-08 LAB
ALBUMIN SERPL BCP-MCNC: 4.2 G/DL (ref 3.5–5.2)
ALP SERPL-CCNC: 45 U/L (ref 38–126)
ALT SERPL W/O P-5'-P-CCNC: 22 U/L (ref 10–44)
ANION GAP SERPL CALC-SCNC: 10 MMOL/L (ref 8–16)
AST SERPL-CCNC: 18 U/L (ref 15–46)
BASOPHILS # BLD AUTO: 0.02 K/UL (ref 0–0.2)
BASOPHILS NFR BLD: 0.2 % (ref 0–1.9)
BILIRUB SERPL-MCNC: 0.6 MG/DL (ref 0.1–1)
CALCIUM SERPL-MCNC: 9.2 MG/DL (ref 8.7–10.5)
CHLORIDE SERPL-SCNC: 103 MMOL/L (ref 95–110)
CO2 SERPL-SCNC: 22 MMOL/L (ref 23–29)
CREAT SERPL-MCNC: 0.72 MG/DL (ref 0.5–1.4)
DIFFERENTIAL METHOD: ABNORMAL
EOSINOPHIL # BLD AUTO: 0.1 K/UL (ref 0–0.5)
EOSINOPHIL NFR BLD: 0.6 % (ref 0–8)
ERYTHROCYTE [DISTWIDTH] IN BLOOD BY AUTOMATED COUNT: 13.1 % (ref 11.5–14.5)
EST. GFR  (AFRICAN AMERICAN): >60 ML/MIN/1.73 M^2
EST. GFR  (NON AFRICAN AMERICAN): >60 ML/MIN/1.73 M^2
GLUCOSE SERPL-MCNC: 96 MG/DL (ref 70–110)
HCT VFR BLD AUTO: 33 % (ref 37–48.5)
HGB BLD-MCNC: 10.7 G/DL (ref 12–16)
IMM GRANULOCYTES # BLD AUTO: 0.03 K/UL (ref 0–0.04)
IMM GRANULOCYTES NFR BLD AUTO: 0.4 % (ref 0–0.5)
LYMPHOCYTES # BLD AUTO: 2 K/UL (ref 1–4.8)
LYMPHOCYTES NFR BLD: 23 % (ref 18–48)
MCH RBC QN AUTO: 27.6 PG (ref 27–31)
MCHC RBC AUTO-ENTMCNC: 32.4 G/DL (ref 32–36)
MCV RBC AUTO: 85 FL (ref 82–98)
MONOCYTES # BLD AUTO: 0.8 K/UL (ref 0.3–1)
MONOCYTES NFR BLD: 9.1 % (ref 4–15)
NEUTROPHILS # BLD AUTO: 5.7 K/UL (ref 1.8–7.7)
NEUTROPHILS NFR BLD: 66.7 % (ref 38–73)
NRBC BLD-RTO: 0 /100 WBC
PLATELET # BLD AUTO: 367 K/UL (ref 150–450)
PMV BLD AUTO: 9.6 FL (ref 9.2–12.9)
POTASSIUM SERPL-SCNC: 3.4 MMOL/L (ref 3.5–5.1)
PROT SERPL-MCNC: 7.5 G/DL (ref 6–8.4)
RBC # BLD AUTO: 3.87 M/UL (ref 4–5.4)
SODIUM SERPL-SCNC: 135 MMOL/L (ref 136–145)
UUN UR-MCNC: 7 MG/DL (ref 7–17)
WBC # BLD AUTO: 8.54 K/UL (ref 3.9–12.7)

## 2022-07-08 PROCEDURE — 99284 EMERGENCY DEPT VISIT MOD MDM: CPT | Mod: 25,ER

## 2022-07-08 PROCEDURE — 85025 COMPLETE CBC W/AUTO DIFF WBC: CPT | Mod: ER | Performed by: EMERGENCY MEDICINE

## 2022-07-08 PROCEDURE — 96361 HYDRATE IV INFUSION ADD-ON: CPT | Mod: ER

## 2022-07-08 PROCEDURE — 63600175 PHARM REV CODE 636 W HCPCS: Mod: ER | Performed by: EMERGENCY MEDICINE

## 2022-07-08 PROCEDURE — 80053 COMPREHEN METABOLIC PANEL: CPT | Mod: ER | Performed by: EMERGENCY MEDICINE

## 2022-07-08 PROCEDURE — 96374 THER/PROPH/DIAG INJ IV PUSH: CPT | Mod: ER

## 2022-07-08 RX ORDER — DEXTROSE MONOHYDRATE AND SODIUM CHLORIDE 5; .9 G/100ML; G/100ML
1000 INJECTION, SOLUTION INTRAVENOUS
Status: COMPLETED | OUTPATIENT
Start: 2022-07-08 | End: 2022-07-09

## 2022-07-08 RX ORDER — ONDANSETRON 2 MG/ML
4 INJECTION INTRAMUSCULAR; INTRAVENOUS ONCE
Status: COMPLETED | OUTPATIENT
Start: 2022-07-08 | End: 2022-07-08

## 2022-07-08 RX ORDER — ONDANSETRON 2 MG/ML
4 INJECTION INTRAMUSCULAR; INTRAVENOUS ONCE
Status: DISCONTINUED | OUTPATIENT
Start: 2022-07-09 | End: 2022-07-08

## 2022-07-08 RX ADMIN — DEXTROSE AND SODIUM CHLORIDE 1000 ML: 5; .9 INJECTION, SOLUTION INTRAVENOUS at 11:07

## 2022-07-08 RX ADMIN — ONDANSETRON 4 MG: 2 INJECTION INTRAMUSCULAR; INTRAVENOUS at 11:07

## 2022-07-09 VITALS
OXYGEN SATURATION: 100 % | HEART RATE: 80 BPM | DIASTOLIC BLOOD PRESSURE: 70 MMHG | SYSTOLIC BLOOD PRESSURE: 116 MMHG | TEMPERATURE: 99 F | BODY MASS INDEX: 35.32 KG/M2 | HEIGHT: 65 IN | WEIGHT: 212 LBS | RESPIRATION RATE: 19 BRPM

## 2022-07-09 LAB
BACTERIA #/AREA URNS AUTO: ABNORMAL /HPF
BILIRUB UR QL STRIP: ABNORMAL
CLARITY UR REFRACT.AUTO: CLEAR
COLOR UR AUTO: YELLOW
GLUCOSE UR QL STRIP: ABNORMAL
HGB UR QL STRIP: NEGATIVE
KETONES UR QL STRIP: ABNORMAL
LEUKOCYTE ESTERASE UR QL STRIP: NEGATIVE
MICROSCOPIC COMMENT: ABNORMAL
NITRITE UR QL STRIP: NEGATIVE
PH UR STRIP: 7 [PH] (ref 5–8)
PROT UR QL STRIP: ABNORMAL
RBC #/AREA URNS AUTO: 0 /HPF (ref 0–4)
SP GR UR STRIP: 1.01 (ref 1–1.03)
SQUAMOUS #/AREA URNS AUTO: 4 /HPF
URN SPEC COLLECT METH UR: ABNORMAL
UROBILINOGEN UR STRIP-ACNC: ABNORMAL EU/DL
WBC #/AREA URNS AUTO: 5 /HPF (ref 0–5)
YEAST UR QL AUTO: ABNORMAL

## 2022-07-09 PROCEDURE — 81000 URINALYSIS NONAUTO W/SCOPE: CPT | Mod: ER | Performed by: EMERGENCY MEDICINE

## 2022-07-09 NOTE — DISCHARGE INSTRUCTIONS
Vitamin B6 25mg  Take 1 tablet before breakfast, 1 tablet before lunch, and 2 tablets at bedtime.  DO NOT exceed 4 tablets per day.  Doxylamine 25mg  Take 1/2 tablet before breakfast, 1/2 tablet before lunch, and 1 tablet at bedtime.

## 2022-07-09 NOTE — ED PROVIDER NOTES
Encounter Date: 2022       History     Chief Complaint   Patient presents with    Vomiting     Patient states that she is about 2 months pregnant and has been dx with hyperemesis gravidarum. Was seen about 2weeks ago for same thing. Reports 8-9 episodes of vomiting today and reports that it has been about 3-4 days since she has been able to keep anything down.      Patient presents at roughly 8wks EGA as a  (1 SAB) with concern re ongoing nausea and vomiting.  This has been an ongoing issue during the pregnancy with her last ED visit about 2 weeks ago.  Patient is taking both Zofran and Phenergan without much success.  She describes about 8-9 episodes today with about 5 yesterday.  Notes food intolerance for 2-3 days.  Patient is under the care of Dr Viera at [facility].        Review of patient's allergies indicates:  No Known Allergies  Past Medical History:   Diagnosis Date    Hypertension affecting pregnancy in third trimester 3/13/2019    Nerve palsy     left arm     Past Surgical History:   Procedure Laterality Date     SECTION N/A 3/14/2019    Procedure:  SECTION;  Surgeon: Neftali Viera MD;  Location: [facility] L&D;  Service: OB/GYN;  Laterality: N/A;     No family history on file.  Social History     Tobacco Use    Smoking status: Never Smoker    Smokeless tobacco: Never Used   Substance Use Topics    Alcohol use: No    Drug use: No     Review of Systems   Constitutional: Negative for chills and fever.   HENT: Negative for congestion and rhinorrhea.    Respiratory: Negative for cough and shortness of breath.    Cardiovascular: Negative for chest pain and palpitations.   Gastrointestinal: Positive for nausea and vomiting. Negative for abdominal pain and diarrhea.   Genitourinary: Negative for dysuria.   Skin: Negative for color change and rash.   Allergic/Immunologic: Negative for immunocompromised state.   Neurological: Negative for dizziness and light-headedness.    Hematological: Negative for adenopathy. Does not bruise/bleed easily.       Physical Exam     Initial Vitals [07/08/22 2245]   BP Pulse Resp Temp SpO2   135/70 92 20 98.7 °F (37.1 °C) 100 %      MAP       --         Physical Exam    Nursing note and vitals reviewed.  Constitutional: She appears well-developed and well-nourished. She is not diaphoretic. No distress.   HENT:   Head: Normocephalic and atraumatic.   Nose: Nose normal.   Mouth/Throat: Oropharynx is clear and moist.   Eyes: Conjunctivae are normal. No scleral icterus.   Neck: Neck supple. No JVD present.   Cardiovascular: Normal rate, regular rhythm, normal heart sounds and intact distal pulses.   Pulmonary/Chest: Breath sounds normal. No respiratory distress.   Abdominal: Abdomen is soft. She exhibits no distension. There is no abdominal tenderness.   Musculoskeletal:      Cervical back: Neck supple.     Neurological: She is alert and oriented to person, place, and time. She has normal strength.   Skin: Skin is warm and dry.       ED Course   Procedures  Labs Reviewed   CBC W/ AUTO DIFFERENTIAL - Abnormal; Notable for the following components:       Result Value    RBC 3.87 (*)     Hemoglobin 10.7 (*)     Hematocrit 33.0 (*)     All other components within normal limits   COMPREHENSIVE METABOLIC PANEL - Abnormal; Notable for the following components:    Sodium 135 (*)     Potassium 3.4 (*)     CO2 22 (*)     All other components within normal limits   URINALYSIS, REFLEX TO URINE CULTURE - Abnormal; Notable for the following components:    Protein, UA Trace (*)     Glucose, UA 3+ (*)     Ketones, UA 2+ (*)     Bilirubin (UA) 1+ (*)     Urobilinogen, UA 4.0-6.0 (*)     All other components within normal limits    Narrative:     Preferred Collection Type->Urine, Clean Catch  Specimen Source->Urine  Collection Type->Urine, Clean Catch   URINALYSIS MICROSCOPIC - Abnormal; Notable for the following components:    Bacteria Few (*)     All other components  within normal limits    Narrative:     Preferred Collection Type->Urine, Clean Catch  Specimen Source->Urine  Collection Type->Urine, Clean Catch          Imaging Results    None          Medications   dextrose 5 % and 0.9 % NaCl infusion (1,000 mLs Intravenous New Bag 7/8/22 2306)   dextrose 5 % and 0.9 % NaCl infusion (1,000 mLs Intravenous New Bag 7/8/22 2306)   ondansetron injection 4 mg (4 mg Intravenous Given 7/8/22 2305)     Medical Decision Making:   ED Management:  All findings were reviewed with the patient/family in detail.  Patient's nausea is resolved on reassessment.  She appears to be adequately hydrated at this point.  We have advised her of the over-the-counter medications (B6 and doxylamine)  that may be taken to help avoid nausea during pregnancy.  I see no indication of an emergent process beyond that addressed during our encounter but have duly counseled the patient/family regarding the need for prompt follow-up as well as the indications that should prompt immediate return to the emergency room should new or worrisome developments occur.  The patient has additionally been provided with printed information regarding diagnosis as well as instructions regarding prompt OB follow up and any medications intended to manage the patient's aforementioned conditions.  The patient/family communicates understanding of all this information and all remaining questions and concerns were addressed at this time.                            Clinical Impression:   Final diagnoses:  [O21.0] Hyperemesis gravidarum (Primary)          ED Disposition Condition    Discharge Stable        ED Prescriptions     None        Follow-up Information     Follow up With Specialties Details Why Contact Info    PCP  Schedule an appointment as soon as possible for a visit  for reassessment     [facility] - Emergency Dept Emergency Medicine Go to  As needed, If symptoms worsen [address] [facility]  [address]  [phone]           Beny Johnson MD  07/09/22 0133

## 2022-11-23 ENCOUNTER — HOSPITAL ENCOUNTER (EMERGENCY)
Facility: HOSPITAL | Age: 32
Discharge: HOME | End: 2022-11-23
Attending: EMERGENCY MEDICINE
Payer: MEDICAID

## 2022-11-23 VITALS
TEMPERATURE: 100 F | DIASTOLIC BLOOD PRESSURE: 67 MMHG | OXYGEN SATURATION: 99 % | HEIGHT: 65 IN | WEIGHT: 211 LBS | HEART RATE: 100 BPM | BODY MASS INDEX: 35.16 KG/M2 | SYSTOLIC BLOOD PRESSURE: 123 MMHG | RESPIRATION RATE: 19 BRPM

## 2022-11-23 DIAGNOSIS — R50.9 FEVER, UNSPECIFIED FEVER CAUSE: ICD-10-CM

## 2022-11-23 DIAGNOSIS — N39.0 URINARY TRACT INFECTION WITHOUT HEMATURIA, SITE UNSPECIFIED: Primary | ICD-10-CM

## 2022-11-23 LAB
ANION GAP SERPL CALC-SCNC: 7 MMOL/L (ref 8–16)
BASOPHILS # BLD AUTO: 0.03 K/UL (ref 0–0.2)
BASOPHILS NFR BLD: 0.4 % (ref 0–1.9)
BILIRUB UR QL STRIP: NEGATIVE
CALCIUM SERPL-MCNC: 8.4 MG/DL (ref 8.7–10.5)
CHLORIDE SERPL-SCNC: 109 MMOL/L (ref 95–110)
CLARITY UR REFRACT.AUTO: CLEAR
CO2 SERPL-SCNC: 20 MMOL/L (ref 23–29)
COLOR UR AUTO: YELLOW
CREAT SERPL-MCNC: 0.52 MG/DL (ref 0.5–1.4)
DIFFERENTIAL METHOD: ABNORMAL
EOSINOPHIL # BLD AUTO: 0.2 K/UL (ref 0–0.5)
EOSINOPHIL NFR BLD: 2.1 % (ref 0–8)
ERYTHROCYTE [DISTWIDTH] IN BLOOD BY AUTOMATED COUNT: 13 % (ref 11.5–14.5)
EST. GFR  (NO RACE VARIABLE): >60 ML/MIN/1.73 M^2
GLUCOSE SERPL-MCNC: 87 MG/DL (ref 70–110)
GLUCOSE UR QL STRIP: NEGATIVE
HCT VFR BLD AUTO: 27.7 % (ref 37–48.5)
HGB BLD-MCNC: 9 G/DL (ref 12–16)
HGB UR QL STRIP: NEGATIVE
IMM GRANULOCYTES # BLD AUTO: 0.12 K/UL (ref 0–0.04)
IMM GRANULOCYTES NFR BLD AUTO: 1.6 % (ref 0–0.5)
INFLUENZA A, MOLECULAR: NEGATIVE
INFLUENZA B, MOLECULAR: NEGATIVE
KETONES UR QL STRIP: ABNORMAL
LEUKOCYTE ESTERASE UR QL STRIP: NEGATIVE
LYMPHOCYTES # BLD AUTO: 0.9 K/UL (ref 1–4.8)
LYMPHOCYTES NFR BLD: 11.2 % (ref 18–48)
MCH RBC QN AUTO: 28.5 PG (ref 27–31)
MCHC RBC AUTO-ENTMCNC: 32.5 G/DL (ref 32–36)
MCV RBC AUTO: 88 FL (ref 82–98)
MONOCYTES # BLD AUTO: 0.9 K/UL (ref 0.3–1)
MONOCYTES NFR BLD: 12.1 % (ref 4–15)
NEUTROPHILS # BLD AUTO: 5.5 K/UL (ref 1.8–7.7)
NEUTROPHILS NFR BLD: 72.6 % (ref 38–73)
NITRITE UR QL STRIP: NEGATIVE
NRBC BLD-RTO: 0 /100 WBC
PH UR STRIP: 7 [PH] (ref 5–8)
PLATELET # BLD AUTO: 245 K/UL (ref 150–450)
PMV BLD AUTO: 10.3 FL (ref 9.2–12.9)
POTASSIUM SERPL-SCNC: 3.8 MMOL/L (ref 3.5–5.1)
PROT UR QL STRIP: NEGATIVE
RBC # BLD AUTO: 3.16 M/UL (ref 4–5.4)
SARS-COV-2 RDRP RESP QL NAA+PROBE: NEGATIVE
SODIUM SERPL-SCNC: 136 MMOL/L (ref 136–145)
SP GR UR STRIP: 1.02 (ref 1–1.03)
SPECIMEN SOURCE: NORMAL
URN SPEC COLLECT METH UR: ABNORMAL
UROBILINOGEN UR STRIP-ACNC: 1 EU/DL
UUN UR-MCNC: 4 MG/DL (ref 7–17)
WBC # BLD AUTO: 7.61 K/UL (ref 3.9–12.7)

## 2022-11-23 PROCEDURE — 80048 BASIC METABOLIC PNL TOTAL CA: CPT | Mod: ER | Performed by: EMERGENCY MEDICINE

## 2022-11-23 PROCEDURE — 81003 URINALYSIS AUTO W/O SCOPE: CPT | Mod: ER | Performed by: EMERGENCY MEDICINE

## 2022-11-23 PROCEDURE — 87502 INFLUENZA DNA AMP PROBE: CPT | Mod: ER | Performed by: EMERGENCY MEDICINE

## 2022-11-23 PROCEDURE — 63600175 PHARM REV CODE 636 W HCPCS: Mod: ER | Performed by: EMERGENCY MEDICINE

## 2022-11-23 PROCEDURE — 87502 INFLUENZA DNA AMP PROBE: CPT | Mod: ER

## 2022-11-23 PROCEDURE — 99284 EMERGENCY DEPT VISIT MOD MDM: CPT | Mod: 25,ER

## 2022-11-23 PROCEDURE — 96365 THER/PROPH/DIAG IV INF INIT: CPT | Mod: ER

## 2022-11-23 PROCEDURE — 85025 COMPLETE CBC W/AUTO DIFF WBC: CPT | Mod: ER | Performed by: EMERGENCY MEDICINE

## 2022-11-23 PROCEDURE — U0002 COVID-19 LAB TEST NON-CDC: HCPCS | Mod: ER | Performed by: EMERGENCY MEDICINE

## 2022-11-23 RX ORDER — AMOXICILLIN AND CLAVULANATE POTASSIUM 400; 57 MG/5ML; MG/5ML
875 POWDER, FOR SUSPENSION ORAL 2 TIMES DAILY
Qty: 153 ML | Refills: 0 | Status: SHIPPED | OUTPATIENT
Start: 2022-11-23 | End: 2022-11-23 | Stop reason: SDUPTHER

## 2022-11-23 RX ORDER — AMOXICILLIN AND CLAVULANATE POTASSIUM 400; 57 MG/5ML; MG/5ML
875 POWDER, FOR SUSPENSION ORAL 2 TIMES DAILY
Qty: 153 ML | Refills: 0 | Status: SHIPPED | OUTPATIENT
Start: 2022-11-23 | End: 2022-11-30

## 2022-11-23 RX ADMIN — CEFTRIAXONE 1 G: 1 INJECTION, SOLUTION INTRAVENOUS at 06:11

## 2022-11-23 NOTE — ED PROVIDER NOTES
Encounter Date: 2022       History     Chief Complaint   Patient presents with    Fever     Diagnosed with bladder infection last Thursday given rx for keflex states had bad reaction- sulf a burps- would like a different med  States started running fever today.   Pt is 28 wks pregnant. No leaking of fluids no vaginal bleeding. States is having abdominal pain but that is not new. . Obgyn is Dr. Hutchins due to deliver at [place]     This is a 32-year-old female  currently 28 weeks pregnant who presents for evaluation of low-grade fever, nasal congestion, and dysuria.  She notes that she was diagnosed with the urinary tract infection recently was prescribed cephalexin but was unable take it as it gave her bad indigestion and burping as result she has not had any antibiotics for last 3 days.  Today is the 1st day she has had a fever as well as the congestion.  She had been treated previously with a different antibiotic for a regular UTI but notes that now it has progressed to a bladder infection and she thinks that must be more serious.  She can not recall anything like this in the past, nothing in particular seems to make it any better, it seems to be progressive with time.    Review of patient's allergies indicates:  No Known Allergies  Past Medical History:   Diagnosis Date    Hypertension affecting pregnancy in third trimester 3/13/2019    Nerve palsy     left arm     Past Surgical History:   Procedure Laterality Date     SECTION N/A 3/14/2019    Procedure:  SECTION;  Surgeon: Neftali Viera MD;  Location: [facility] L&D;  Service: OB/GYN;  Laterality: N/A;     No family history on file.  Social History     Tobacco Use    Smoking status: Never    Smokeless tobacco: Never   Substance Use Topics    Alcohol use: No    Drug use: No     Review of Systems  Constitutional- positive fever  HEENT-positive congestion  Eyes-no redness  Respiratory-no shortness of breath  Cardio-no chest pain  GI-no  abdominal pain  Endocrine-no cold intolerance  -no difficulty urinating positive dysuria  MSK-no myalgias  Skin-no rashes  Allergy-no environmental allergy  Neurologic-, no headache  Hematology-no swollen nodes  Behavioral-no confusion  Physical Exam     Initial Vitals [11/23/22 1724]   BP Pulse Resp Temp SpO2   133/80 101 19 99.5 °F (37.5 °C) 99 %      MAP       --         Physical Exam  Constitutional:  Generally well-appearing 32-year-old female in no obvious distress  Eyes: Conjunctivae normal.  ENT       Head: Normocephalic, atraumatic.       Nose: Normal external appearance        Mouth/Throat: no strigulous respirations   Hematological/Lymphatic/Immunilogical: no visible lymphadenopathy   Cardiovascular: Normal rate,   Respiratory: Normal respiratory effort.   Gastrointestinal:  Obviously gravid abdomen, palpable fundus above the level of the umbilicus no rebound, no guarding  Musculoskeletal: Normal range of motion in all extremities. No obvious deformities or swelling.  Neurologic: Alert, oriented. Normal speech and language. No gross focal neurologic deficits are appreciated.  Skin: Skin is warm, dry. No rash noted.  Psychiatric: Mood and affect are normal.   ED Course   Procedures  Labs Reviewed   URINALYSIS, REFLEX TO URINE CULTURE - Abnormal; Notable for the following components:       Result Value    Ketones, UA 3+ (*)     All other components within normal limits    Narrative:     Preferred Collection Type->Urine, Clean Catch  Specimen Source->Urine   CBC W/ AUTO DIFFERENTIAL - Abnormal; Notable for the following components:    RBC 3.16 (*)     Hemoglobin 9.0 (*)     Hematocrit 27.7 (*)     Immature Granulocytes 1.6 (*)     Immature Grans (Abs) 0.12 (*)     Lymph # 0.9 (*)     Lymph % 11.2 (*)     All other components within normal limits   BASIC METABOLIC PANEL - Abnormal; Notable for the following components:    CO2 20 (*)     BUN 4 (*)     Calcium 8.4 (*)     Anion Gap 7 (*)     All other  components within normal limits   INFLUENZA A & B BY MOLECULAR   SARS-COV-2 RNA AMPLIFICATION, QUAL    Narrative:     Is the patient symptomatic?->Yes          Imaging Results    None          Medications   cefTRIAXone (ROCEPHIN) 1 g/50 mL D5W IVPB (0 g Intravenous Stopped 11/23/22 1833)     Medical Decision Making:   History:   Old Medical Records: I decided to obtain old medical records.  Old Records Summarized: records from clinic visits and records from previous admission(s).  Differential Diagnosis:   Influenza, COVID, cystitis, pyelonephritis  Clinical Tests:   Lab Tests: Ordered and Reviewed           ED Course as of 11/23/22 2145 Wed Nov 23, 2022   1747 Took Tylenol just prior to arrival.  Will defer administration of further antipyretics.  Will treat urinary tract infection with dose of Rocephin given that she has not taken any antibiotic in left several days. [TK]      ED Course User Index  [TK] Roshan Echeverria MD                 Clinical Impression:   Final diagnoses:  [N39.0] Urinary tract infection without hematuria, site unspecified (Primary)  [R50.9] Fever, unspecified fever cause      ED Disposition Condition    Discharge Stable          ED Prescriptions       Medication Sig Dispense Start Date End Date Auth. Provider    amoxicillin-clavulanate (AUGMENTIN) 400-57 mg/5 mL SusR  (Status: Discontinued) Take 10.9 mLs (872 mg total) by mouth 2 (two) times daily. for 7 days 153 mL 11/23/2022 11/23/2022 Roshan Echeverria MD    amoxicillin-clavulanate (AUGMENTIN) 400-57 mg/5 mL SusR Take 10.9 mLs (872 mg total) by mouth 2 (two) times daily. for 7 days 153 mL 11/23/2022 11/30/2022 Kody Acuña MD          Follow-up Information       Follow up With Specialties Details Why Contact Info    Your OB/Gyn  Schedule an appointment as soon as possible for a visit in 3 days For follow-up on today's visit.     [facility] - Emergency Dept Emergency Medicine Go to  As needed, If symptoms worsen [address] [facility] [facility] [address] [phone]             Roshan Echeverria MD  11/23/22 7770

## 2022-11-24 NOTE — PROVIDER PROGRESS NOTES - EMERGENCY DEPT.
Encounter Date: 11/23/2022    ED Physician Progress Notes        Took over care of the patient after thorough discussion of case with Dr. Echeverria.  Patient remained in stable condition on reassessment was well-appearing.  Partially completed antibiotics course with new urinalysis today unremarkable.  Will transition to Augmentin which was Dr. Echeverria's plan.  Patient appears safe for discharge in outpatient follow-up.  Return to ED precautions given for worsening or changing symptoms.

## 2023-01-12 ENCOUNTER — HOSPITAL ENCOUNTER (OUTPATIENT)
Facility: HOSPITAL | Age: 33
Discharge: HOME | End: 2023-01-12
Attending: OBSTETRICS & GYNECOLOGY | Admitting: OBSTETRICS & GYNECOLOGY
Payer: MEDICAID

## 2023-01-12 VITALS
OXYGEN SATURATION: 100 % | TEMPERATURE: 99 F | SYSTOLIC BLOOD PRESSURE: 95 MMHG | DIASTOLIC BLOOD PRESSURE: 46 MMHG | HEART RATE: 120 BPM

## 2023-01-12 DIAGNOSIS — U07.1 COVID-19 VIRUS DETECTED: ICD-10-CM

## 2023-01-12 LAB — SARS-COV-2 RDRP RESP QL NAA+PROBE: POSITIVE

## 2023-01-12 PROCEDURE — U0002 COVID-19 LAB TEST NON-CDC: HCPCS | Performed by: OBSTETRICS & GYNECOLOGY

## 2023-01-12 NOTE — NURSING
Patient confirmed to be Covid-19 positive; this RN made patient aware. Patient opting for treatment with Paxlovid, called into pharmacy by Dr Richards. Pharmacy will drop off to patient prior to discharge from triage.   Patient offered IV hydration, opting to PO hydrate. Discussed home care with patient including tylenol, monitoring O2 & fevers, and returning to ED/L&D for treatment for shortness of breath, signs of hypoxia, or for decreased fetal movement/bleeding/lof. Discussed labor precautions with patients and kick counts, and needing to tell Dr Viera's office she is Covid-19 positive. Patient verbalizes understanding of instructions. Preparing written discharge instructions for patient and awaiting pharmacy's drop off of prescription.   Patient in room with family, instructed to stay in room.

## 2023-01-12 NOTE — DISCHARGE INSTRUCTIONS
Home Undelivered Discharge Instructions    After Discharge Orders:    No future appointments.    Call physician or midwife's office as needed for instructions.     Current Discharge Medication List        START taking these medications    Details   nirmatrelvir-ritonavir 300 mg (150 mg x 2)-100 mg copackaged tablets (EUA) Take 3 tablets by mouth 2 (two) times daily for 5 days. Each dose contains 2 nirmatrelvir (pink tablets) and 1 ritonavir (white tablet). Take all 3 tablets together  Qty: 30 tablet, Refills: 0           CONTINUE these medications which have NOT CHANGED    Details   ondansetron (ZOFRAN) 4 MG tablet Take 1 tablet (4 mg total) by mouth every 6 (six) hours as needed for Nausea.  Qty: 12 tablet, Refills: 0      promethazine (PHENERGAN) 25 MG tablet Take 1 tablet (25 mg total) by mouth every 6 (six) hours as needed for Nausea.  Qty: 15 tablet, Refills: 0                     Diet:  regular as tolerated, increase hydration and fluids.     Rest: as much as possible. Quarantine for minimum 5 days.       Other instructions: Do kick counts once a day on your baby. Choose the time of day your baby is most active. Get in a comfortable lying or sitting position and time how long it takes to feel 10 kicks, twists, turns, swishes, or rolls. Call your physician or midwife if there have not been 10 kicks in 2 hours. Take Tylenol for fevers and pain,alert your OB's office that you have been diagnosed with Covid-19. Wear a mask around family, especially immunocompromised family.     Call physician or midwife, return to Labor and Delivery, call 911, or go to the nearest Emergency Room if: increased leakage or fluid, decreased fetal movement, bleeding from vaginal area, difficulty breathing, new calf pain, persistent headache, vision change, or for any signs of hypoxia such as blue lips or fingertips, contractions that increase in frequency/duration/intensity over 2 hours not relieved with Tylenol/hydration.

## 2023-01-12 NOTE — NURSING
"Patient is a 32 year old  at 35&4 presenting to triage with c/o Covid-19. Patient states she woke up in the middle of the night with extremity aches, contraction pains, fever, chills, and malaise as well as a cough and headache. Patient states she took Tylenol Cold & Flu last night and has since not had contractions today, but is still worried she has Covid-19 and came to be tested. Patient's OB is Dr Viera at [facility]; patient states she was not told her MD did not deliver at this facility anymore and she came here today seeking care from him. Discussed that Dr Viera now at [facility] with patient who verbalizes understanding.   Patient placed on cefm- FHTs 155, accels, moderate variability. Patient reports positive fetal movement and denies bleeding/lof, however, states she felt a "pop" in the shower while coughing but has not experienced leaking/wetness. Patient also states she did not receive the flu shot this season.   Dr Susie MD on call at desk up on patient's arrival to triage. Dr Richards states to swab patient for Covid-19 and perform NST. NST reactive/reassuring, swab to lab, in process.     "

## 2023-01-12 NOTE — NURSING
Patient given written and verbal discharge instructions including signs and symptoms of hypoxia and labor precautions. Patient given medications sent from pharmacy, instructed to take as directed and call pharmacy with any questions regarding prescription. Patient verbalizes understanding.   Patient discharged home to self care with family. Ambulated off unit.

## 2023-04-25 ENCOUNTER — HOSPITAL ENCOUNTER (EMERGENCY)
Facility: HOSPITAL | Age: 33
Discharge: HOME | End: 2023-04-25
Attending: EMERGENCY MEDICINE
Payer: MEDICAID

## 2023-04-25 VITALS
BODY MASS INDEX: 31.95 KG/M2 | HEART RATE: 72 BPM | WEIGHT: 192 LBS | OXYGEN SATURATION: 98 % | TEMPERATURE: 99 F | SYSTOLIC BLOOD PRESSURE: 138 MMHG | DIASTOLIC BLOOD PRESSURE: 82 MMHG | RESPIRATION RATE: 16 BRPM

## 2023-04-25 DIAGNOSIS — D27.1 OVARIAN TERATOMA, LEFT: Primary | ICD-10-CM

## 2023-04-25 DIAGNOSIS — D27.0 OVARIAN TERATOMA, RIGHT: ICD-10-CM

## 2023-04-25 LAB
ALBUMIN SERPL BCP-MCNC: 4.1 G/DL (ref 3.5–5.2)
ALP SERPL-CCNC: 60 U/L (ref 55–135)
ALT SERPL W/O P-5'-P-CCNC: 23 U/L (ref 10–44)
ANION GAP SERPL CALC-SCNC: 11 MMOL/L (ref 8–16)
AST SERPL-CCNC: 17 U/L (ref 10–40)
B-HCG UR QL: NEGATIVE
BACTERIA #/AREA URNS HPF: ABNORMAL /HPF
BASOPHILS # BLD AUTO: 0.04 K/UL (ref 0–0.2)
BASOPHILS NFR BLD: 0.5 % (ref 0–1.9)
BILIRUB SERPL-MCNC: 0.5 MG/DL (ref 0.1–1)
BILIRUB UR QL STRIP: NEGATIVE
BUN SERPL-MCNC: 11 MG/DL (ref 6–20)
CALCIUM SERPL-MCNC: 9 MG/DL (ref 8.7–10.5)
CHLORIDE SERPL-SCNC: 106 MMOL/L (ref 95–110)
CLARITY UR: CLEAR
CO2 SERPL-SCNC: 21 MMOL/L (ref 23–29)
COLOR UR: YELLOW
CREAT SERPL-MCNC: 0.9 MG/DL (ref 0.5–1.4)
CTP QC/QA: YES
DIFFERENTIAL METHOD: ABNORMAL
EOSINOPHIL # BLD AUTO: 0.1 K/UL (ref 0–0.5)
EOSINOPHIL NFR BLD: 1.4 % (ref 0–8)
ERYTHROCYTE [DISTWIDTH] IN BLOOD BY AUTOMATED COUNT: 14.1 % (ref 11.5–14.5)
EST. GFR  (NO RACE VARIABLE): >60 ML/MIN/1.73 M^2
GLUCOSE SERPL-MCNC: 96 MG/DL (ref 70–110)
GLUCOSE UR QL STRIP: NEGATIVE
HCT VFR BLD AUTO: 32.8 % (ref 37–48.5)
HGB BLD-MCNC: 10.7 G/DL (ref 12–16)
HGB UR QL STRIP: ABNORMAL
HYALINE CASTS #/AREA URNS LPF: 0 /LPF
IMM GRANULOCYTES # BLD AUTO: 0.03 K/UL (ref 0–0.04)
IMM GRANULOCYTES NFR BLD AUTO: 0.3 % (ref 0–0.5)
KETONES UR QL STRIP: NEGATIVE
LEUKOCYTE ESTERASE UR QL STRIP: NEGATIVE
LIPASE SERPL-CCNC: 20 U/L (ref 4–60)
LYMPHOCYTES # BLD AUTO: 2.3 K/UL (ref 1–4.8)
LYMPHOCYTES NFR BLD: 25.5 % (ref 18–48)
MCH RBC QN AUTO: 27.2 PG (ref 27–31)
MCHC RBC AUTO-ENTMCNC: 32.6 G/DL (ref 32–36)
MCV RBC AUTO: 83 FL (ref 82–98)
MICROSCOPIC COMMENT: ABNORMAL
MONOCYTES # BLD AUTO: 0.8 K/UL (ref 0.3–1)
MONOCYTES NFR BLD: 8.7 % (ref 4–15)
NEUTROPHILS # BLD AUTO: 5.7 K/UL (ref 1.8–7.7)
NEUTROPHILS NFR BLD: 63.6 % (ref 38–73)
NITRITE UR QL STRIP: NEGATIVE
NRBC BLD-RTO: 0 /100 WBC
PH UR STRIP: 6 [PH] (ref 5–8)
PLATELET # BLD AUTO: 305 K/UL (ref 150–450)
PMV BLD AUTO: 10.9 FL (ref 9.2–12.9)
POTASSIUM SERPL-SCNC: 3.3 MMOL/L (ref 3.5–5.1)
PROT SERPL-MCNC: 7.7 G/DL (ref 6–8.4)
PROT UR QL STRIP: ABNORMAL
RBC # BLD AUTO: 3.94 M/UL (ref 4–5.4)
RBC #/AREA URNS HPF: 20 /HPF (ref 0–4)
SODIUM SERPL-SCNC: 138 MMOL/L (ref 136–145)
SP GR UR STRIP: 1.02 (ref 1–1.03)
SQUAMOUS #/AREA URNS HPF: 1 /HPF
URN SPEC COLLECT METH UR: ABNORMAL
UROBILINOGEN UR STRIP-ACNC: NEGATIVE EU/DL
WBC # BLD AUTO: 8.87 K/UL (ref 3.9–12.7)
WBC #/AREA URNS HPF: 0 /HPF (ref 0–5)

## 2023-04-25 PROCEDURE — 99285 EMERGENCY DEPT VISIT HI MDM: CPT | Mod: 25

## 2023-04-25 PROCEDURE — 25500020 PHARM REV CODE 255: Performed by: EMERGENCY MEDICINE

## 2023-04-25 PROCEDURE — 81000 URINALYSIS NONAUTO W/SCOPE: CPT | Performed by: EMERGENCY MEDICINE

## 2023-04-25 PROCEDURE — 83690 ASSAY OF LIPASE: CPT | Performed by: EMERGENCY MEDICINE

## 2023-04-25 PROCEDURE — 25000003 PHARM REV CODE 250: Performed by: EMERGENCY MEDICINE

## 2023-04-25 PROCEDURE — 85025 COMPLETE CBC W/AUTO DIFF WBC: CPT | Performed by: EMERGENCY MEDICINE

## 2023-04-25 PROCEDURE — 80053 COMPREHEN METABOLIC PANEL: CPT | Performed by: EMERGENCY MEDICINE

## 2023-04-25 PROCEDURE — 81025 URINE PREGNANCY TEST: CPT | Performed by: EMERGENCY MEDICINE

## 2023-04-25 RX ORDER — OXYCODONE AND ACETAMINOPHEN 5; 325 MG/1; MG/1
1 TABLET ORAL
Status: COMPLETED | OUTPATIENT
Start: 2023-04-25 | End: 2023-04-25

## 2023-04-25 RX ORDER — OXYCODONE AND ACETAMINOPHEN 5; 325 MG/1; MG/1
1 TABLET ORAL EVERY 6 HOURS PRN
Qty: 12 TABLET | Refills: 0 | Status: SHIPPED | OUTPATIENT
Start: 2023-04-25

## 2023-04-25 RX ORDER — NAPROXEN 500 MG/1
500 TABLET ORAL 2 TIMES DAILY WITH MEALS
Qty: 20 TABLET | Refills: 0 | Status: SHIPPED | OUTPATIENT
Start: 2023-04-25

## 2023-04-25 RX ADMIN — IOHEXOL 100 ML: 350 INJECTION, SOLUTION INTRAVENOUS at 02:04

## 2023-04-25 RX ADMIN — OXYCODONE HYDROCHLORIDE AND ACETAMINOPHEN 1 TABLET: 5; 325 TABLET ORAL at 04:04

## 2023-04-25 NOTE — ED PROVIDER NOTES
Encounter Date: 2023       History     Chief Complaint   Patient presents with    Abdominal Pain     Patient reports abdominal pain to RLQ and right lateral side mid region x 2 hours.  Patient states no nausea.  No vomiting.  Patient reports  approximately 2.5 months ago.  States incision site has healed well.  LBM today normal brown and formed.  Patient denies UTI symptoms.       The patient is a 33-year-old female who came to the emergency department with lower abdominal pain tonight.  She states she had low back pain yesterday and today the pain migrated around to her lower abdomen and is worse in the right lower quadrant.  She denies any burning with urination, no fever, no nausea vomiting or diarrhea.  The patient is status post  2 months ago.  She is currently on her menses.    Review of patient's allergies indicates:  No Known Allergies  Past Medical History:   Diagnosis Date    Hypertension affecting pregnancy in third trimester 3/13/2019    Nerve palsy     left arm     Past Surgical History:   Procedure Laterality Date     SECTION N/A 3/14/2019    Procedure:  SECTION;  Surgeon: Neftali Viera MD;  Location: [facility] L&D;  Service: OB/GYN;  Laterality: N/A;     History reviewed. No pertinent family history.  Social History     Tobacco Use    Smoking status: Never    Smokeless tobacco: Never   Substance Use Topics    Alcohol use: No    Drug use: No     Review of Systems   Constitutional:  Negative for fever.   HENT:  Negative for sore throat.    Respiratory:  Negative for shortness of breath.    Cardiovascular:  Negative for chest pain.   Gastrointestinal:  Positive for abdominal pain. Negative for constipation, diarrhea, nausea and vomiting.   Genitourinary:  Negative for dysuria.   Musculoskeletal:  Negative for back pain.   Skin:  Negative for rash.   Neurological:  Negative for weakness.   Hematological:  Does not bruise/bleed easily.     Physical Exam     Initial Vitals  [04/25/23 0018]   BP Pulse Resp Temp SpO2   (!) 128/91 103 16 98.5 °F (36.9 °C) 100 %      MAP       --         Physical Exam    Nursing note and vitals reviewed.  Constitutional: She appears well-developed and well-nourished.   HENT:   Head: Normocephalic and atraumatic.   Mouth/Throat: Oropharynx is clear and moist.   Eyes: Conjunctivae and EOM are normal. Pupils are equal, round, and reactive to light.   Neck: Neck supple.   Normal range of motion.  Cardiovascular:  Normal rate, regular rhythm, normal heart sounds and intact distal pulses.     Exam reveals no gallop and no friction rub.       No murmur heard.  Pulmonary/Chest: Breath sounds normal.   Abdominal: Abdomen is soft. Bowel sounds are normal. She exhibits no distension. There is abdominal tenderness (Right lower quadrant). There is no rebound and no guarding.   Musculoskeletal:         General: No tenderness or edema. Normal range of motion.      Cervical back: Normal range of motion and neck supple.     Lymphadenopathy:     She has no cervical adenopathy.   Neurological: She is alert and oriented to person, place, and time. She has normal strength and normal reflexes.   Skin: Skin is warm and dry.   Psychiatric: She has a normal mood and affect. Her behavior is normal. Judgment and thought content normal.       ED Course   Procedures  Labs Reviewed   CBC W/ AUTO DIFFERENTIAL - Abnormal; Notable for the following components:       Result Value    RBC 3.94 (*)     Hemoglobin 10.7 (*)     Hematocrit 32.8 (*)     All other components within normal limits   COMPREHENSIVE METABOLIC PANEL - Abnormal; Notable for the following components:    Potassium 3.3 (*)     CO2 21 (*)     All other components within normal limits   URINALYSIS, REFLEX TO URINE CULTURE - Abnormal; Notable for the following components:    Protein, UA 1+ (*)     Occult Blood UA 3+ (*)     All other components within normal limits    Narrative:     Specimen Source->Urine   URINALYSIS  MICROSCOPIC - Abnormal; Notable for the following components:    RBC, UA 20 (*)     All other components within normal limits    Narrative:     Specimen Source->Urine   LIPASE   POCT URINE PREGNANCY          Imaging Results               CT Abdomen Pelvis With Contrast (Final result)  Result time 04/25/23 02:36:36      Final result by Axel Michael MD (04/25/23 02:36:36)                   Impression:      Bilateral pelvic mass lesions likely representing bilateral ovarian dermoid/teratoma, measuring up to approximately 5.7 cm on the right, and approximately 5.2 cm on the left, as discussed above.    Prominence of the region of the lower uterine segment/cervix for which clinical and historical correlation and evaluation is recommended.    Urinary bladder wall thickening may relate to incomplete distention however correlation for UTI/cystitis is needed.    This report was flagged in Epic as abnormal.      Electronically signed by: Axel Michael  Date:    04/25/2023  Time:    02:36               Narrative:    EXAMINATION:  CT ABDOMEN PELVIS WITH CONTRAST    CLINICAL HISTORY:  RLQ abdominal pain (Age >= 14y);    TECHNIQUE:  Low dose axial images, sagittal and coronal reformations were obtained from the lung bases to the pubic symphysis following the IV administration of 100 mL of Omnipaque 350 oral contrast was not utilized.  Single phase postcontrast CT examination of the abdomen and pelvis is submitted.    COMPARISON:  None.    FINDINGS:  The visualized lung bases appear clear.  The stomach demonstrates nonspecific appearance of mild-to-moderate distention with ingested material, fluid and air.  There is no evidence for pericholecystic or peripancreatic inflammatory change, there is no abnormal pancreatic or biliary ductal dilatation.  There is no evidence for acute process of the liver, gallbladder, pancreas, spleen, or adrenal glands.    There is no evidence for hydronephrosis or obstructive uropathy or  perinephric inflammatory change bilaterally.  The abdominal aorta appears normal in caliber, demonstrates appropriate opacification.  There is mild urinary bladder wall thickening, this may relate to incomplete distention however correlation for UTI/cystitis is needed.    There is prominence of the region of the lower uterine segment/cervix for which clinical and historical correlation and evaluation is recommended.    There are bilateral pelvic mass lesions, there is a lesion seen in the pelvis on the right measuring up to approximately 4.9 x 5.7 cm on axial imaging and measuring approximately 4.7 cm in the craniocaudal dimension on coronal reconstruction imaging.  This demonstrates mixed attenuation character including soft tissue density, fat density and calcification.  There is an additional pelvic mass lesion more inferiorly in the pelvis along the midline and extending to the left of midline measuring approximately 3.8 x 5.2 cm on axial imaging.  This measures up to approximately 4.5 cm in the craniocaudal dimension on coronal reconstruction imaging.  This also demonstrates variable attenuation character consistent with soft tissue, calcification and fat density.  These likely represent bilateral ovarian dermoid/teratoma.    There is no evidence for small bowel obstructive process.  The appendix is identified, it does not appear inflamed.  There is no evidence for inflammatory or obstructive process of the colon.  There is no evidence for free intraperitoneal air.    The visualized osseous structures appear intact with mild chronic change noted.  There is scoliotic curvature of the spine noted.                                       Medications   iohexoL (OMNIPAQUE 350) injection 100 mL (100 mLs Intravenous Given 4/25/23 0215)   oxyCODONE-acetaminophen 5-325 mg per tablet 1 tablet (1 tablet Oral Given 4/25/23 0410)     Medical Decision Making:   Initial Assessment:   History provided by the  patient  Differential Diagnosis:   Appendicitis, ovarian cyst, constipation, epiploic appendagitis  Clinical Tests:   Lab Tests: Ordered and Reviewed  Radiological Study: Ordered and Reviewed           ED Course as of 05/02/23 2231 Tue Apr 25, 2023 0138 CBC W/ AUTO DIFFERENTIAL(!) [ST]   0138 Comp. Metabolic Panel(!) [ST]   0138 Lipase [ST]   0138 POCT urine pregnancy  Urinalysis pending [ST]   0159 The patient will be signed out to Dr. Valverde at 2:00 a.m. awaiting CT scan. [ST]   0401 Received patient sign out at shift change.  Normal CBC and chemistry as well as lipase.  Negative pregnancy test.  Imaging shows bilateral ovarian dermoid teratomas.  Recommend follow-up with OBGYN.  Prescribed analgesics.  Stable for discharge. [AP]      ED Course User Index  [AP] Yuriy Valverde DO  [ST] Day Tang MD                   Clinical Impression:   Final diagnoses:  [D27.1] Ovarian teratoma, left (Primary)  [D27.0] Ovarian teratoma, right        ED Disposition Condition    Discharge Stable          ED Prescriptions       Medication Sig Dispense Start Date End Date Auth. Provider    naproxen (NAPROSYN) 500 MG tablet Take 1 tablet (500 mg total) by mouth 2 (two) times daily with meals. 20 tablet 4/25/2023 -- Yuriy Valverde DO    oxyCODONE-acetaminophen (PERCOCET) 5-325 mg per tablet Take 1 tablet by mouth every 6 (six) hours as needed for Pain. 12 tablet 4/25/2023 -- Yuriy Valverde DO          Follow-up Information       Follow up With Specialties Details Why Contact Info Additional Information    [place] - OB GYN Obstetrics and Gynecology Call in 1 day  [address], [facility] [[email or web address] [address]  [facility] [address] [phone] Please park in [address] or [address] and use [address]. Take Medical Office Bldg. elevators.    [place] - Emergency Dept Emergency Medicine  If symptoms worsen [address]  [facility] [address] [phone]              Day Tang MD  05/02/23 2231

## 2023-05-04 ENCOUNTER — HOSPITAL ENCOUNTER (EMERGENCY)
Facility: HOSPITAL | Age: 33
Discharge: HOME | End: 2023-05-04
Attending: FAMILY MEDICINE
Payer: COMMERCIAL

## 2023-05-04 VITALS
TEMPERATURE: 99 F | BODY MASS INDEX: 31.65 KG/M2 | OXYGEN SATURATION: 100 % | DIASTOLIC BLOOD PRESSURE: 93 MMHG | SYSTOLIC BLOOD PRESSURE: 142 MMHG | RESPIRATION RATE: 18 BRPM | WEIGHT: 190 LBS | HEART RATE: 90 BPM | HEIGHT: 65 IN

## 2023-05-04 DIAGNOSIS — M54.50 ACUTE BILATERAL LOW BACK PAIN WITHOUT SCIATICA: ICD-10-CM
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
## 2023-05-04 DIAGNOSIS — S16.1XXA CERVICAL MUSCLE STRAIN, INITIAL ENCOUNTER: ICD-10-CM

## 2023-05-04 PROCEDURE — 99283 EMERGENCY DEPT VISIT LOW MDM: CPT | Mod: ER

## 2023-05-04 NOTE — DISCHARGE INSTRUCTIONS
Apply warm compresses, performed gentle stretching exercises.  Close follow-up with PCP for continued care.    Your x-ray imaging is unremarkable today.
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
## 2024-05-07 ENCOUNTER — HOSPITAL ENCOUNTER (EMERGENCY)
Facility: HOSPITAL | Age: 34
Discharge: HOME | End: 2024-05-07
Attending: STUDENT IN AN ORGANIZED HEALTH CARE EDUCATION/TRAINING PROGRAM
Payer: MEDICAID

## 2024-05-07 VITALS
OXYGEN SATURATION: 99 % | RESPIRATION RATE: 17 BRPM | BODY MASS INDEX: 33.32 KG/M2 | TEMPERATURE: 99 F | HEIGHT: 65 IN | WEIGHT: 200 LBS | SYSTOLIC BLOOD PRESSURE: 167 MMHG | HEART RATE: 83 BPM | DIASTOLIC BLOOD PRESSURE: 91 MMHG

## 2024-05-07 DIAGNOSIS — M62.838 MUSCLE SPASM: ICD-10-CM

## 2024-05-07 DIAGNOSIS — M54.12 CERVICAL RADICULOPATHY: Primary | ICD-10-CM

## 2024-05-07 DIAGNOSIS — H53.8 BLURRY VISION, BILATERAL: ICD-10-CM

## 2024-05-07 DIAGNOSIS — S16.1XXA CERVICAL STRAIN, ACUTE, INITIAL ENCOUNTER: ICD-10-CM

## 2024-05-07 LAB
B-HCG UR QL: NEGATIVE
CTP QC/QA: YES

## 2024-05-07 PROCEDURE — 25000003 PHARM REV CODE 250: Mod: ER | Performed by: PHYSICIAN ASSISTANT

## 2024-05-07 PROCEDURE — 99284 EMERGENCY DEPT VISIT MOD MDM: CPT | Mod: 25,ER

## 2024-05-07 PROCEDURE — 81025 URINE PREGNANCY TEST: CPT | Mod: ER | Performed by: NURSE PRACTITIONER

## 2024-05-07 RX ORDER — KETOROLAC TROMETHAMINE 10 MG/1
10 TABLET, FILM COATED ORAL
Status: COMPLETED | OUTPATIENT
Start: 2024-05-07 | End: 2024-05-07

## 2024-05-07 RX ORDER — METHOCARBAMOL 500 MG/1
1000 TABLET, FILM COATED ORAL
Status: COMPLETED | OUTPATIENT
Start: 2024-05-07 | End: 2024-05-07

## 2024-05-07 RX ORDER — PREDNISONE 20 MG/1
40 TABLET ORAL DAILY
Qty: 10 TABLET | Refills: 0 | Status: SHIPPED | OUTPATIENT
Start: 2024-05-07 | End: 2024-05-12

## 2024-05-07 RX ORDER — METHOCARBAMOL 500 MG/1
500 TABLET, FILM COATED ORAL 4 TIMES DAILY
Qty: 40 TABLET | Refills: 0 | Status: SHIPPED | OUTPATIENT
Start: 2024-05-07 | End: 2024-05-17

## 2024-05-07 RX ADMIN — METHOCARBAMOL 1000 MG: 500 TABLET ORAL at 03:05

## 2024-05-07 RX ADMIN — KETOROLAC TROMETHAMINE 10 MG: 10 TABLET, FILM COATED ORAL at 03:05

## 2024-05-07 NOTE — FIRST PROVIDER EVALUATION
Emergency Department TeleTriage Encounter Note      CHIEF COMPLAINT    Chief Complaint   Patient presents with    Muscle Pain     Pt C/O neck and bilateral shoulder pain following swimming X 2 weeks.  Pt reports symptom onset immediately following swimming.        VITAL SIGNS   Initial Vitals [05/07/24 1402]   BP Pulse Resp Temp SpO2   (!) 158/92 83 17 99 °F (37.2 °C) 99 %      MAP       --            ALLERGIES    Review of patient's allergies indicates:  No Known Allergies    PROVIDER TRIAGE NOTE  Verbal consent for the teletriage evaluation was given by the patient at the start of the evaluation.  All efforts will be made to maintain patient's privacy during the evaluation.      This is a teletriage evaluation of a 34 y.o. female presenting to the ED with c/o neck pain and bilateral hand tingling that started 2 weeks ago. Limited physical exam via telehealth: The patient is awake, alert, answering questions appropriately and is not in respiratory distress.  As the Teletriage provider, I performed an initial assessment and ordered appropriate labs and imaging studies, if any, to facilitate the patient's care once placed in the ED. Once a room is available, care and a full evaluation will be completed by an alternate ED provider.  Any additional orders and the final disposition will be determined by that provider.  All imaging and labs will not be followed-up by the Teletriage Team, including myself.          ORDERS  Labs Reviewed   POCT URINE PREGNANCY       ED Orders (720h ago, onward)      Start Ordered     Status Ordering Provider    05/07/24 1411 05/07/24 1410  X-Ray Cervical Spine AP And Lateral  1 time imaging         Ordered JULIANNA PICKARD    05/07/24 1411 05/07/24 1410  POCT urine pregnancy  Once         Ordered JULIANNA PICKARD              Virtual Visit Note: The provider triage portion of this emergency department evaluation and documentation was performed via [employer], a HIPAA-compliant telemedicine  application, in concert with a tele-presenter in the room. A face to face patient evaluation with one of my colleagues will occur once the patient is placed in an emergency department room.      DISCLAIMER: This note was prepared with [employer] voice recognition transcription software. Garbled syntax, mangled pronouns, and other bizarre constructions may be attributed to that software system.

## 2024-05-07 NOTE — ED PROVIDER NOTES
"Encounter Date: 2024       History     Chief Complaint   Patient presents with    Muscle Pain     Pt C/O neck and bilateral shoulder pain following swimming X 2 weeks.  Pt reports symptom onset immediately following swimming.      This is a 34-year-old  female that presents to the ED with complaint of neck pain bilateral pain of the muscles in the neck, bilateral posterior shoulder pain, and intermittent shooting and burning pains down into her hands depending on movement.  She states this began about 2 weeks ago following going swimming and exercising.  She states she routinely swims so this is not out of the norm for her.  She was unable to quantify her pain currently but states her neck pain feels very "tight" and aching.  She describes the intermittent pain in her arms as shooting, pins and needles, and burning.  She also admits to intermittent black spots or floaters with her vision for the last month if she changes positions quickly.  She currently denies that.  She also denies any fever, neck stiffness, cold symptoms, chest pain, shortness for breath, abdominal pain, nausea, vomiting, diarrhea.      Review of patient's allergies indicates:  No Known Allergies  Past Medical History:   Diagnosis Date    Hypertension affecting pregnancy in third trimester 3/13/2019    Nerve palsy     left arm     Past Surgical History:   Procedure Laterality Date     SECTION N/A 3/14/2019    Procedure:  SECTION;  Surgeon: Neftali Viera MD;  Location: [facility] L&D;  Service: OB/GYN;  Laterality: N/A;     No family history on file.  Social History     Tobacco Use    Smoking status: Never    Smokeless tobacco: Never   Substance Use Topics    Alcohol use: No    Drug use: No     Review of Systems   Constitutional:  Negative for fever.   Eyes:  Positive for visual disturbance. Negative for photophobia.   Respiratory:  Negative for cough and shortness of breath.    Cardiovascular:  Negative for chest " pain and palpitations.   Gastrointestinal:  Negative for nausea and vomiting.   Musculoskeletal:  Positive for neck pain. Negative for neck stiffness.   Neurological:  Negative for dizziness and light-headedness.       Physical Exam     Initial Vitals [05/07/24 1402]   BP Pulse Resp Temp SpO2   (!) 158/92 83 17 99 °F (37.2 °C) 99 %      MAP       --         Physical Exam    Constitutional: She appears well-developed and well-nourished.   HENT:   Head: Normocephalic and atraumatic.   Neck: Neck supple. No Brudzinski's sign and no Kernig's sign noted.   There is bilateral tenderness to palpation noted over the paraspinal muscles of the cervical spine from roughly C4-C7.  This tenderness extends the bilateral trapezius muscles.  There are no meningeal signs.  There is no neck stiffness.  The patient has full range of motion despite mild pain.  Motor and sensory intact.  Distal pulses intact.   Normal range of motion.  Cardiovascular:  Normal rate, regular rhythm and normal heart sounds.           Pulmonary/Chest: Breath sounds normal. She has no wheezes.   Musculoskeletal:      Cervical back: Normal range of motion and neck supple. No edema, erythema or rigidity. Spinous process tenderness and muscular tenderness present. Normal range of motion.     Neurological: She is alert and oriented to person, place, and time.   Psychiatric: She has a normal mood and affect. Thought content normal.         ED Course   Procedures  Labs Reviewed   POCT URINE PREGNANCY          Imaging Results              X-Ray Cervical Spine AP And Lateral (Final result)  Result time 05/07/24 15:46:28      Final result by Arcadio Banuelos MD (05/07/24 15:46:28)                   Impression:      1.  Negative for acute process involving the cervical spine.      Electronically signed by: Arcadio Banuelos MD  Date:    05/07/2024  Time:    15:46               Narrative:    EXAMINATION:  XR CERVICAL SPINE AP LATERAL    CLINICAL HISTORY:  neck  "pain;    COMPARISON:  No comparison studies are available.    FINDINGS:  There is normal alignment of the 7 cervical vertebra.   The vertebral body heights and intervertebral disc heights are   well maintained. The posterior elements are intact and the prevertebral soft tissues are normal thickness. The orientation of the dens and the lateral masses are normal.    The lung apices are clear.                                       Medications   methocarbamoL tablet 1,000 mg (1,000 mg Oral Given 5/7/24 1542)   ketorolac tablet 10 mg (10 mg Oral Given 5/7/24 1541)     Medical Decision Making  This is a 34-year-old  female that presents the ED with complaint of neck pain, shoulder pain, and intermittent shooting and burning pains into her hands.  On arrival the patient is afebrile and nontoxic-appearing with stable vital signs.  Differential diagnoses include but are not limited to:  Cervical radiculopathy, muscle strain, muscle spasm, cervical fracture, subluxation.  Please see physical exam for further details.  The patient has a completely normal neurological examination with no focal deficits.  She has full range of the motion in the neck with no meningeal signs or stiffness.  I do not suspect meningitis or spinal abscess.  I believe this patient has cervical radiculopathy secondary to cervical muscle strains and muscle spasm.  UPT was negative.  The patient was given Robaxin and Toradol in the ED. x-ray of the cervical spine was independently interpreted by me and shows significant straightening of the cervical spine which further indicates likely cervical radiculopathy and muscle strains.  She will be discharged home with a prescription for prednisone and Robaxin.  She has been instructed she can also take ibuprofen as needed.  I told her she is to avoid strenuous activity over the next 72 hours to allow time for this to improve.  Given the patient's intermittent "black spots" for the last month at " least, a referral to Ophthalmology was placed.  I do not believe this is an emergent or life-threatening issue at this time and outpatient follow up is appropriate.  She is to have PCP follow up in the next 2-3 days or return to the ED for worsening.  She verbalized understanding and was agreeable to the treatment plan.    Risk  Prescription drug management.                                      Clinical Impression:  Final diagnoses:  [M54.12] Cervical radiculopathy (Primary)  [S16.1XXA] Cervical strain, acute, initial encounter  [M62.838] Muscle spasm  [H53.8] Blurry vision, bilateral          ED Disposition Condition    Discharge Stable          ED Prescriptions       Medication Sig Dispense Start Date End Date Auth. Provider    methocarbamoL (ROBAXIN) 500 MG Tab Take 1 tablet (500 mg total) by mouth 4 (four) times daily. for 10 days 40 tablet 5/7/2024 5/17/2024 Jeevan Díaz PA-C    predniSONE (DELTASONE) 20 MG tablet Take 2 tablets (40 mg total) by mouth once daily. for 5 days 10 tablet 5/7/2024 5/12/2024 Jeevan Díaz PA-C          Follow-up Information       Follow up With Specialties Details Why Contact [facility] [place] - Emergency Dept Emergency Medicine  If symptoms worsen [address] [address] [phone]    Ophthamology  Schedule an appointment as soon as possible for a visit       Your Doctor                 Jeevan Díaz PA-C  05/07/24 3906

## 2024-05-07 NOTE — ED NOTES
Pt reports to ED with c/o neck pain that travels to both shoulders      Adult Physical Assessment  LOC:  y.o. female verified via two identifiers.  The patient is awake, alert, oriented and speaking appropriately at this time.  APPEARANCE: Patient resting comfortably and appears to be in no acute distress at this time. Patient is clean and well groomed, patient's clothing is properly fastened.  SKIN:The skin is warm and dry, color consistent with ethnicity, patient has normal skin turgor and moist mucus membranes, skin intact, no breakdown or brusing noted.  MUSCULOSKELETAL: Patient moving all extremities well, no obvious swelling or deformities noted.  RESPIRATORY: Airway is open and patent, respirations are spontaneous, patient has a normal effort and rate, no accessory muscle use noted.    CARDIAC: Patient has a normal rate and rhythm, no periphreal edema noted in any extremity, capillary refill < 3 seconds in all extremities  ABDOMEN: Soft and non tender to palpation, no abdominal distention noted. Bowel sounds present in all four quadrants.  NEUROLOGIC: Eyes open spontaneously, behavior appropriate to situation, follows commands, facial expression symmetrical, bilateral hand grasp equal and even, purposeful motor response noted, normal sensation in all extremities when touched with a finger.

## 2024-09-30 ENCOUNTER — HOSPITAL ENCOUNTER (EMERGENCY)
Facility: HOSPITAL | Age: 34
Discharge: HOME | End: 2024-09-30
Attending: EMERGENCY MEDICINE
Payer: MEDICAID

## 2024-09-30 VITALS
RESPIRATION RATE: 20 BRPM | BODY MASS INDEX: 36.65 KG/M2 | WEIGHT: 220 LBS | TEMPERATURE: 101 F | HEIGHT: 65 IN | HEART RATE: 120 BPM | DIASTOLIC BLOOD PRESSURE: 97 MMHG | SYSTOLIC BLOOD PRESSURE: 139 MMHG | OXYGEN SATURATION: 98 %

## 2024-09-30 DIAGNOSIS — J02.0 STREP PHARYNGITIS: Primary | ICD-10-CM

## 2024-09-30 LAB
B-HCG UR QL: NEGATIVE
CTP QC/QA: YES
GROUP A STREP, MOLECULAR: POSITIVE
INFLUENZA A, MOLECULAR: NEGATIVE
INFLUENZA B, MOLECULAR: NEGATIVE
SARS-COV-2 RDRP RESP QL NAA+PROBE: NEGATIVE
SPECIMEN SOURCE: NORMAL

## 2024-09-30 PROCEDURE — 81025 URINE PREGNANCY TEST: CPT | Mod: ER | Performed by: EMERGENCY MEDICINE

## 2024-09-30 PROCEDURE — 25000003 PHARM REV CODE 250: Mod: ER | Performed by: EMERGENCY MEDICINE

## 2024-09-30 PROCEDURE — 87651 STREP A DNA AMP PROBE: CPT | Mod: ER | Performed by: EMERGENCY MEDICINE

## 2024-09-30 PROCEDURE — U0002 COVID-19 LAB TEST NON-CDC: HCPCS | Mod: ER | Performed by: EMERGENCY MEDICINE

## 2024-09-30 PROCEDURE — 63600175 PHARM REV CODE 636 W HCPCS: Mod: ER | Performed by: EMERGENCY MEDICINE

## 2024-09-30 PROCEDURE — 96372 THER/PROPH/DIAG INJ SC/IM: CPT | Performed by: EMERGENCY MEDICINE

## 2024-09-30 PROCEDURE — 99284 EMERGENCY DEPT VISIT MOD MDM: CPT | Mod: 25,ER

## 2024-09-30 PROCEDURE — 87502 INFLUENZA DNA AMP PROBE: CPT | Mod: ER | Performed by: EMERGENCY MEDICINE

## 2024-09-30 RX ORDER — ONDANSETRON 4 MG/1
4 TABLET, ORALLY DISINTEGRATING ORAL EVERY 6 HOURS PRN
Qty: 20 TABLET | Refills: 0 | Status: SHIPPED | OUTPATIENT
Start: 2024-09-30

## 2024-09-30 RX ORDER — AMOXICILLIN AND CLAVULANATE POTASSIUM 875; 125 MG/1; MG/1
1 TABLET, FILM COATED ORAL 2 TIMES DAILY
Qty: 14 TABLET | Refills: 0 | Status: SHIPPED | OUTPATIENT
Start: 2024-09-30

## 2024-09-30 RX ORDER — AMOXICILLIN AND CLAVULANATE POTASSIUM 875; 125 MG/1; MG/1
1 TABLET, FILM COATED ORAL
Status: COMPLETED | OUTPATIENT
Start: 2024-09-30 | End: 2024-09-30

## 2024-09-30 RX ORDER — DICLOFENAC SODIUM 75 MG/1
75 TABLET, DELAYED RELEASE ORAL 2 TIMES DAILY
Qty: 60 TABLET | Refills: 0 | Status: SHIPPED | OUTPATIENT
Start: 2024-09-30

## 2024-09-30 RX ORDER — METHYLPREDNISOLONE SOD SUCC 125 MG
125 VIAL (EA) INJECTION
Status: COMPLETED | OUTPATIENT
Start: 2024-09-30 | End: 2024-09-30

## 2024-09-30 RX ORDER — ACETAMINOPHEN 500 MG
1000 TABLET ORAL
Status: COMPLETED | OUTPATIENT
Start: 2024-09-30 | End: 2024-09-30

## 2024-09-30 RX ORDER — PREDNISONE 20 MG/1
60 TABLET ORAL DAILY
Qty: 21 TABLET | Refills: 0 | Status: SHIPPED | OUTPATIENT
Start: 2024-09-30 | End: 2024-10-07

## 2024-09-30 RX ORDER — ONDANSETRON 4 MG/1
4 TABLET, ORALLY DISINTEGRATING ORAL
Status: COMPLETED | OUTPATIENT
Start: 2024-09-30 | End: 2024-09-30

## 2024-09-30 RX ADMIN — AMOXICILLIN AND CLAVULANATE POTASSIUM 1 TABLET: 875; 125 TABLET, FILM COATED ORAL at 10:09

## 2024-09-30 RX ADMIN — ACETAMINOPHEN 1000 MG: 500 TABLET ORAL at 10:09

## 2024-09-30 RX ADMIN — METHYLPREDNISOLONE SODIUM SUCCINATE 125 MG: 125 INJECTION, POWDER, FOR SOLUTION INTRAMUSCULAR; INTRAVENOUS at 10:09

## 2024-09-30 RX ADMIN — ONDANSETRON 4 MG: 4 TABLET, ORALLY DISINTEGRATING ORAL at 10:09

## 2024-10-01 NOTE — ED PROVIDER NOTES
Encounter Date: 2024       History     Chief Complaint   Patient presents with    Headache    Chills    Sore Throat     Pt c/o headache, chills, sore throat, reports that her throat is swollen and nausea.          HPI  34 y.o.  Sore throat, frontal ha, nausea x this AM  Tried nyquil  Review of patient's allergies indicates:  No Known Allergies  Past Medical History:   Diagnosis Date    Hypertension affecting pregnancy in third trimester 3/13/2019    Nerve palsy     left arm     Past Surgical History:   Procedure Laterality Date     SECTION N/A 3/14/2019    Procedure:  SECTION;  Surgeon: Neftali Viera MD;  Location: [facility] L&D;  Service: OB/GYN;  Laterality: N/A;     No family history on file.  Social History     Tobacco Use    Smoking status: Never    Smokeless tobacco: Never   Substance Use Topics    Alcohol use: No    Drug use: No     Review of Systems  All systems were reviewed/examined and were negative except as noted in the HPI.    Physical Exam     Initial Vitals [24 2148]   BP Pulse Resp Temp SpO2   (!) 139/97 (!) 133 20 (!) 100.6 °F (38.1 °C) 98 %      MAP       --         Physical Exam    General: the patient is awake, alert, and in no apparent distress.  Head: normocephalic and atraumatic, sclera are clear  OP mild erythema no signs of PTA   Nl voice no stridor  Neck: supple without meningismus  Chest: clear to auscultation bilaterally, no respiratory distress  Heart: tachy r rr  ABD soft, nontender, nondistended, no peritoneal signs  Extremities: warm and well perfused  Skin: warm and dry  Psych conversant  Neuro: awake, alert, moving all extremities    ED Course   Procedures  Labs Reviewed   GROUP A STREP, MOLECULAR - Abnormal       Result Value    Group A Strep, Molecular Positive (*)    INFLUENZA A & B BY MOLECULAR    Influenza A, Molecular Negative      Influenza B, Molecular Negative      Flu A & B Source Nasal swab     SARS-COV-2 RNA AMPLIFICATION, QUAL    SARS-CoV-2 RNA,  "Amplification, Qual Negative     POCT URINE PREGNANCY    POC Preg Test, Ur Negative       Acceptable Yes            Imaging Results    None          Medications   acetaminophen tablet 1,000 mg (1,000 mg Oral Given 9/30/24 2237)   ondansetron disintegrating tablet 4 mg (4 mg Oral Given 9/30/24 2208)   amoxicillin-clavulanate 875-125mg per tablet 1 tablet (1 tablet Oral Given 9/30/24 2237)   methylPREDNISolone sodium succinate injection 125 mg (125 mg Intramuscular Given 9/30/24 2238)     Medical Decision Making                   Medical Decision Making:    This is an emergent evaluation of a patient presenting to the ED.  Nursing notes were reviewed.  Differential Diagnosis:  Influenza, COVID-19, Viral Illness    Strep +  I personally reviewed and interpreted the laboratory results.    I decided to obtain and review old medical records, which showed: ED visits    BP (!) 139/97   Pulse (!) 120   Temp (!) 100.6 °F (38.1 °C) (Oral)   Resp 20   Ht 5' 5" (1.651 m)   Wt 99.8 kg (220 lb)   LMP 09/29/2024   SpO2 98%   Breastfeeding No   BMI 36.61 kg/m²     Abx      Efra Jefferson MD, FRANCISCO                   Clinical Impression:  Final diagnoses:  [J02.0] Strep pharyngitis (Primary)          ED Disposition Condition    Discharge Stable          ED Prescriptions       Medication Sig Dispense Start Date End Date Auth. Provider    amoxicillin-clavulanate 875-125mg (AUGMENTIN) 875-125 mg per tablet Take 1 tablet by mouth 2 (two) times daily. 14 tablet 9/30/2024 -- Lucian Jefferson MD    ondansetron (ZOFRAN-ODT) 4 MG TbDL Take 1 tablet (4 mg total) by mouth every 6 (six) hours as needed. 20 tablet 9/30/2024 -- Lucian Jefferson MD    predniSONE (DELTASONE) 20 MG tablet Take 3 tablets (60 mg total) by mouth once daily. for 7 days 21 tablet 9/30/2024 10/7/2024 Lucian Jefferson MD    diclofenac (VOLTAREN) 75 MG EC tablet Take 1 tablet (75 mg total) by mouth 2 (two) times daily. 60 tablet 9/30/2024 -- Ronny, " Lucian BASHIR MD          Follow-up Information       Follow up With Specialties Details Why Contact Info Additional Information    [facility] Schedule an appointment as soon as possible for a visit   [address], [address]  [facility] [address] [phone] Please park in [address] or [address] and use [address] entrance. Take Medical Office Bldg. elevators.          Discharged to home in stable condition, return to ED warnings given, follow up and patient care instructions given.      Efra Jefferson MD, [place], [facility]  Department of Emergency Medicine       Lucian Jefferson MD  10/01/24 0154

## 2025-03-19 ENCOUNTER — OFFICE VISIT (OUTPATIENT)
Dept: FAMILY MEDICINE | Facility: HOSPITAL | Age: 35
End: 2025-03-19
Payer: MEDICAID

## 2025-03-19 VITALS
HEART RATE: 101 BPM | DIASTOLIC BLOOD PRESSURE: 90 MMHG | HEIGHT: 65 IN | OXYGEN SATURATION: 99 % | SYSTOLIC BLOOD PRESSURE: 128 MMHG | BODY MASS INDEX: 36.61 KG/M2

## 2025-03-19 DIAGNOSIS — F41.8 DEPRESSION WITH ANXIETY: ICD-10-CM

## 2025-03-19 DIAGNOSIS — R03.0 ELEVATED BP WITHOUT DIAGNOSIS OF HYPERTENSION: Primary | ICD-10-CM

## 2025-03-19 DIAGNOSIS — Z11.3 SCREEN FOR STD (SEXUALLY TRANSMITTED DISEASE): ICD-10-CM

## 2025-03-19 DIAGNOSIS — N92.6 IRREGULAR MENSTRUAL CYCLE: ICD-10-CM

## 2025-03-19 DIAGNOSIS — G56.03 BILATERAL CARPAL TUNNEL SYNDROME: ICD-10-CM

## 2025-03-19 PROBLEM — R50.9 FEVER: Status: RESOLVED | Noted: 2019-02-20 | Resolved: 2025-03-19

## 2025-03-19 PROCEDURE — 99214 OFFICE O/P EST MOD 30 MIN: CPT

## 2025-03-19 RX ORDER — BUPROPION HYDROCHLORIDE 150 MG/1
150 TABLET ORAL DAILY
Qty: 30 TABLET | Refills: 11 | Status: SHIPPED | OUTPATIENT
Start: 2025-03-19 | End: 2026-03-19

## 2025-03-19 RX ORDER — HYDROXYZINE HYDROCHLORIDE 10 MG/1
10 TABLET, FILM COATED ORAL 3 TIMES DAILY PRN
Qty: 60 TABLET | Refills: 0 | Status: SHIPPED | OUTPATIENT
Start: 2025-03-19

## 2025-03-19 RX ADMIN — LIDOCAINE HYDROCHLORIDE 2 ML: 10 INJECTION, SOLUTION INFILTRATION; PERINEURAL at 02:03

## 2025-03-19 RX ADMIN — TRIAMCINOLONE ACETONIDE 40 MG: 40 INJECTION, SUSPENSION INTRA-ARTICULAR; INTRAMUSCULAR at 02:03

## 2025-03-19 NOTE — PROGRESS NOTES
[facility] NOTE  New Patient Visit      SUBJECTIVE:     Patient: Dahlia Forrest is a 35 y.o. female.    Chief Compliant:   Chief Complaint   Patient presents with    Amenorrhea       History of Present Illness:  Irregular cycles - LMP started 3/14. Ended yesterday. LMP prior was Dec 2024/2025. Reports heavy bleeding. Denies cramping, rash, lesions, discharge. Reports PAP up to date.    Numbness hands - onset 3 months. R > L. Worse at night, with driving, or holding onto something. Drives for work. Right-handed. Denies previous work-up or treatment.      Social History:  Smoker - denies  EtOH use - never  Drug use - Patient denies illicit drug use   Sexual history - has sex with males  Contraceptives - condoms  LMP - as above  Pregnancy history -     Review of patient's allergies indicates:  No Known Allergies    Past Medical History:   Diagnosis Date    Hypertension affecting pregnancy in third trimester 3/13/2019    Nerve palsy     left arm       Current Outpatient Medications   Medication Instructions    buPROPion (WELLBUTRIN XL) 150 mg, Oral, Daily    hydrOXYzine HCL (ATARAX) 10 mg, Oral, 3 times daily PRN       Past Surgical History:   Procedure Laterality Date     SECTION N/A 3/14/2019    Procedure:  SECTION;  Surgeon: Neftali Viera MD;  Location: [facility] L&D;  Service: OB/GYN;  Laterality: N/A;       No family history on file.    Social History[1]       Review of Systems   Constitutional:  Negative for fever.   Respiratory:  Negative for shortness of breath.    Cardiovascular:  Negative for chest pain.   Gastrointestinal:  Negative for abdominal pain.   Musculoskeletal:  Positive for joint pain.   Neurological:  Positive for tingling. Negative for headaches.     A 10+ review of systems was performed with pertinent positives and negatives noted above in the history of present illness. Other systems were negative unless otherwise specified.    OBJECTIVE:     Vital Signs  "(Most Recent)  Vitals:    03/19/25 1328   BP: (!) 128/90   BP Location: Right arm   Patient Position: Sitting   Pulse: 101   SpO2: 99%   Height: 5' 5" (1.651 m)     BMI: Body mass index is 36.61 kg/m².     Physical Exam:  Physical Exam  Vitals and nursing note reviewed.   Constitutional:       General: She is not in acute distress.     Appearance: Normal appearance. She is obese. She is not ill-appearing, toxic-appearing or diaphoretic.   HENT:      Head: Normocephalic and atraumatic.      Right Ear: External ear normal.      Left Ear: External ear normal.      Nose: Nose normal.      Mouth/Throat:      Pharynx: Oropharynx is clear.   Eyes:      Extraocular Movements: Extraocular movements intact.   Cardiovascular:      Rate and Rhythm: Normal rate and regular rhythm.      Pulses: Normal pulses.      Heart sounds: Normal heart sounds.   Pulmonary:      Effort: Pulmonary effort is normal. No respiratory distress.      Breath sounds: Normal breath sounds. No wheezing, rhonchi or rales.   Abdominal:      General: Abdomen is flat. There is no distension.      Palpations: Abdomen is soft.      Tenderness: There is no abdominal tenderness. There is no guarding or rebound.   Genitourinary:     Comments: Patient deferred exam  Musculoskeletal:      Right wrist: No tenderness, bony tenderness or snuff box tenderness. Normal range of motion.      Left wrist: No tenderness, bony tenderness or snuff box tenderness. Normal range of motion.      Cervical back: Normal range of motion.      Comments: +Phalens, + tinels bilaterally.    Skin:     General: Skin is warm.   Neurological:      General: No focal deficit present.      Mental Status: She is alert and oriented to person, place, and time. Mental status is at baseline.   Psychiatric:         Mood and Affect: Mood normal.         Behavior: Behavior normal.         Thought Content: Thought content normal.          ASSESSMENT:   Dahlia Forrest is a 35 y.o. female who presents " to clinic to for    1. Elevated BP without diagnosis of hypertension    2. Screen for STD (sexually transmitted disease)    3. Irregular menstrual cycle    4. Bilateral carpal tunnel syndrome    5. Depression with anxiety         PLAN:     Elevated BP without diagnosis of hypertension  - Acute condition.  - Uncontrolled.  - Reviewed previous labs, tests, and/or imaging obtained since last visit.  - Discussed various diagnostic and treatment options with patient at this visit.  - Orders, labs, and imaging ordered as below. Follow-up results with patient.   -     Hemoglobin A1C; Future; Expected date: 03/19/2025  -     Lipid Panel; Future; Expected date: 03/19/2025  -     TSH; Future; Expected date: 03/19/2025  -     Comprehensive Metabolic Panel; Future; Expected date: 03/19/2025  -     CBC Auto Differential; Future; Expected date: 03/19/2025    Screen for STD (sexually transmitted disease)  - Screening indicated. Discussed with patient. Orders as below. Follow up results.   -     HIV 1/2 Ag/Ab (4th Gen); Future; Expected date: 03/19/2025  -     Hepatitis C Antibody; Future; Expected date: 03/19/2025    Irregular menstrual cycle  - Acute condition.  - Uncontrolled.  - Reviewed previous labs, tests, and/or imaging obtained since last visit.  - Discussed various diagnostic and treatment options with patient at this visit.  - Orders, labs, and imaging ordered as below. Follow-up results with patient.   -     Follicle Stimulating Hormone; Future; Expected date: 03/19/2025  -     US Pelvis Complete Non OB; Future; Expected date: 03/19/2025    Bilateral carpal tunnel syndrome  - Chronic condition.  - Uncontrolled.  - Reviewed previous labs, tests, and/or imaging obtained since last visit.  - Discussed various diagnostic and treatment options with patient at this visit.  - Discussed use of thumb spica splints with patient at this visit.  - Orders, labs, and imaging ordered as below. Follow-up results with patient.  - EMG  referral faxed to outside provider.    -     Intermediate Joint Aspiration/Injection    Depression with anxiety  - Acute condition.  - Uncontrolled.  - Reviewed previous labs, tests, and/or imaging obtained since last visit.  - Discussed various diagnostic and treatment options with patient at this visit.  - Medications and/or medication refills as below.  - Referral placed as below.   -     buPROPion (WELLBUTRIN XL) 150 MG TB24 tablet; Take 1 tablet (150 mg total) by mouth once daily.  Dispense: 30 tablet; Refill: 11  -     hydrOXYzine HCL (ATARAX) 10 MG Tab; Take 1 tablet (10 mg total) by mouth 3 (three) times daily as needed (Anxiety).  Dispense: 60 tablet; Refill: 0  -     Ambulatory referral/consult to Psychology; Future; Expected date: 03/26/2025      Provided patient with anticipatory guidance and return precautions. Treatment plan discussed with patient, all questions answered, and patient acknowledged understanding and verbal agreement.      Follow-up in: 2 weeks; or sooner PRN if acute concerns arise.      ________________________  Kentrell Alexis MD  [facility] Family Medicine PGY-3            [1]   Social History  Tobacco Use    Smoking status: Never    Smokeless tobacco: Never   Substance Use Topics    Alcohol use: No    Drug use: No

## 2025-03-19 NOTE — PROGRESS NOTES
Intermediate Joint Aspiration/Injection    Date/Time: 3/19/2025 2:22 PM    Performed by: Kentrell Alexis MD  Authorized by: Guadalupe Kent MD    Consent Done?:  Yes (Written)  Indications:  Pain (Carpal tunnel)  Site marked: The procedure site was marked    Timeout: Prior to procedure the correct patient, procedure, and site was verified      Location:  Wrist  Medications:  2 mL LIDOcaine HCL 10 mg/ml (1%) 10 mg/mL (1 %); 40 mg triamcinolone acetonide 40 mg/mL     Additional Comments: Patient provided with risks, benefits, alternatives. All patient questions answered. Both written and verbal consent obtained. Medications drawn up as ordered. The patient was positioned correctly on the procedure table. Time out was preformed prior to the initiation of the procedure. Marked injection site. ChloraPrep was applied to the injection site and allowed time to completely dry. Next, ethyl chloride was used as a topical anesthetic and the injection area. The needle was inserted into the marked injection area and the medication was injected. Minimal bleeding noted on withdrawal of needle. Band-aid applied. The patient tolerated the procedure well without any complications. The patient was instructed to remain in clinic for 20 minutes afterwards and to report any adverse reaction to me immediately. The patient was able to tolerate both active and passive ROM and able to move the injected joint w/o difficulty. The patient's pain was well controlled prior to leaving the clinic. Standard post-procedure care was explained and all questions and concerns were addressed. The patient was encouraged to call the clinic for any additional questions/concerns. Patient provided with AVS and return precautions prior to leaving clinic.

## 2025-03-22 RX ORDER — LIDOCAINE HYDROCHLORIDE 10 MG/ML
2 INJECTION, SOLUTION INFILTRATION; PERINEURAL
Status: DISCONTINUED | OUTPATIENT
Start: 2025-03-19 | End: 2025-03-22 | Stop reason: HOSPADM

## 2025-03-22 RX ORDER — TRIAMCINOLONE ACETONIDE 40 MG/ML
40 INJECTION, SUSPENSION INTRA-ARTICULAR; INTRAMUSCULAR
Status: DISCONTINUED | OUTPATIENT
Start: 2025-03-19 | End: 2025-03-22 | Stop reason: HOSPADM

## 2025-03-24 ENCOUNTER — RESULTS FOLLOW-UP (OUTPATIENT)
Dept: HEPATOLOGY | Facility: HOSPITAL | Age: 35
End: 2025-03-24